# Patient Record
Sex: MALE | Race: WHITE | HISPANIC OR LATINO | Employment: FULL TIME | URBAN - METROPOLITAN AREA
[De-identification: names, ages, dates, MRNs, and addresses within clinical notes are randomized per-mention and may not be internally consistent; named-entity substitution may affect disease eponyms.]

---

## 2017-08-02 ENCOUNTER — OFFICE VISIT (OUTPATIENT)
Dept: URGENT CARE | Facility: PHYSICIAN GROUP | Age: 37
End: 2017-08-02

## 2017-08-02 VITALS
DIASTOLIC BLOOD PRESSURE: 58 MMHG | SYSTOLIC BLOOD PRESSURE: 98 MMHG | WEIGHT: 150 LBS | HEIGHT: 69 IN | HEART RATE: 74 BPM | TEMPERATURE: 97 F | BODY MASS INDEX: 22.22 KG/M2 | OXYGEN SATURATION: 98 %

## 2017-08-02 DIAGNOSIS — S91.139A PUNCTURE WOUND OF TOE OF LEFT FOOT, INITIAL ENCOUNTER: ICD-10-CM

## 2017-08-02 PROCEDURE — 99203 OFFICE O/P NEW LOW 30 MIN: CPT | Performed by: FAMILY MEDICINE

## 2017-08-02 RX ORDER — HYDROCODONE BITARTRATE AND ACETAMINOPHEN 5; 325 MG/1; MG/1
TABLET ORAL
Qty: 20 TAB | Refills: 0 | Status: SHIPPED | OUTPATIENT
Start: 2017-08-02 | End: 2017-12-06

## 2017-08-02 RX ORDER — CIPROFLOXACIN 500 MG/1
TABLET, FILM COATED ORAL
Qty: 10 TAB | Refills: 0 | Status: SHIPPED | OUTPATIENT
Start: 2017-08-02 | End: 2017-12-06

## 2017-08-02 ASSESSMENT — PATIENT HEALTH QUESTIONNAIRE - PHQ9: CLINICAL INTERPRETATION OF PHQ2 SCORE: 0

## 2017-08-02 NOTE — Clinical Note
August 2, 2017         Patient: Gadiel Edgar   YOB: 1980   Date of Visit: 8/2/2017           To Whom it May Concern:    Gadiel Edgar was seen in my clinic on 8/2/2017.     Please excuse from work for 8/3 and 8/4/17 due to medical condition.    If you have any questions or concerns, please don't hesitate to call.        Sincerely,           Bereket Casillas M.D.  Electronically Signed

## 2017-12-06 ENCOUNTER — HOSPITAL ENCOUNTER (EMERGENCY)
Facility: MEDICAL CENTER | Age: 37
End: 2017-12-06
Attending: EMERGENCY MEDICINE
Payer: MEDICAID

## 2017-12-06 ENCOUNTER — APPOINTMENT (OUTPATIENT)
Dept: RADIOLOGY | Facility: MEDICAL CENTER | Age: 37
End: 2017-12-06
Attending: EMERGENCY MEDICINE
Payer: MEDICAID

## 2017-12-06 VITALS
BODY MASS INDEX: 23.74 KG/M2 | DIASTOLIC BLOOD PRESSURE: 83 MMHG | SYSTOLIC BLOOD PRESSURE: 133 MMHG | TEMPERATURE: 98.4 F | RESPIRATION RATE: 25 BRPM | HEIGHT: 69 IN | WEIGHT: 160.27 LBS | HEART RATE: 55 BPM | OXYGEN SATURATION: 100 %

## 2017-12-06 DIAGNOSIS — R07.89 ATYPICAL CHEST PAIN: ICD-10-CM

## 2017-12-06 LAB
ALBUMIN SERPL BCP-MCNC: 4.4 G/DL (ref 3.2–4.9)
ALBUMIN/GLOB SERPL: 1.7 G/DL
ALP SERPL-CCNC: 50 U/L (ref 30–99)
ALT SERPL-CCNC: 24 U/L (ref 2–50)
ANION GAP SERPL CALC-SCNC: 6 MMOL/L (ref 0–11.9)
APTT PPP: 26.9 SEC (ref 24.7–36)
AST SERPL-CCNC: 24 U/L (ref 12–45)
BASOPHILS # BLD AUTO: 0.4 % (ref 0–1.8)
BASOPHILS # BLD: 0.03 K/UL (ref 0–0.12)
BILIRUB SERPL-MCNC: 0.6 MG/DL (ref 0.1–1.5)
BNP SERPL-MCNC: 21 PG/ML (ref 0–100)
BUN SERPL-MCNC: 14 MG/DL (ref 8–22)
CALCIUM SERPL-MCNC: 9 MG/DL (ref 8.4–10.2)
CHLORIDE SERPL-SCNC: 104 MMOL/L (ref 96–112)
CO2 SERPL-SCNC: 26 MMOL/L (ref 20–33)
CREAT SERPL-MCNC: 0.95 MG/DL (ref 0.5–1.4)
EKG IMPRESSION: NORMAL
EOSINOPHIL # BLD AUTO: 0.27 K/UL (ref 0–0.51)
EOSINOPHIL NFR BLD: 3.2 % (ref 0–6.9)
ERYTHROCYTE [DISTWIDTH] IN BLOOD BY AUTOMATED COUNT: 40.7 FL (ref 35.9–50)
GFR SERPL CREATININE-BSD FRML MDRD: >60 ML/MIN/1.73 M 2
GLOBULIN SER CALC-MCNC: 2.6 G/DL (ref 1.9–3.5)
GLUCOSE SERPL-MCNC: 83 MG/DL (ref 65–99)
HCT VFR BLD AUTO: 44.8 % (ref 42–52)
HGB BLD-MCNC: 15.4 G/DL (ref 14–18)
IMM GRANULOCYTES # BLD AUTO: 0.02 K/UL (ref 0–0.11)
IMM GRANULOCYTES NFR BLD AUTO: 0.2 % (ref 0–0.9)
INR PPP: 1 (ref 0.87–1.13)
LIPASE SERPL-CCNC: 32 U/L (ref 7–58)
LYMPHOCYTES # BLD AUTO: 2.84 K/UL (ref 1–4.8)
LYMPHOCYTES NFR BLD: 33.8 % (ref 22–41)
MCH RBC QN AUTO: 29.7 PG (ref 27–33)
MCHC RBC AUTO-ENTMCNC: 34.4 G/DL (ref 33.7–35.3)
MCV RBC AUTO: 86.3 FL (ref 81.4–97.8)
MONOCYTES # BLD AUTO: 0.46 K/UL (ref 0–0.85)
MONOCYTES NFR BLD AUTO: 5.5 % (ref 0–13.4)
NEUTROPHILS # BLD AUTO: 4.77 K/UL (ref 1.82–7.42)
NEUTROPHILS NFR BLD: 56.9 % (ref 44–72)
NRBC # BLD AUTO: 0 K/UL
NRBC BLD AUTO-RTO: 0 /100 WBC
PLATELET # BLD AUTO: 317 K/UL (ref 164–446)
PMV BLD AUTO: 8.8 FL (ref 9–12.9)
POTASSIUM SERPL-SCNC: 4 MMOL/L (ref 3.6–5.5)
PROT SERPL-MCNC: 7 G/DL (ref 6–8.2)
PROTHROMBIN TIME: 12.8 SEC (ref 12–14.6)
RBC # BLD AUTO: 5.19 M/UL (ref 4.7–6.1)
SODIUM SERPL-SCNC: 136 MMOL/L (ref 135–145)
TROPONIN I SERPL-MCNC: <0.02 NG/ML (ref 0–0.04)
WBC # BLD AUTO: 8.4 K/UL (ref 4.8–10.8)

## 2017-12-06 PROCEDURE — 99284 EMERGENCY DEPT VISIT MOD MDM: CPT

## 2017-12-06 PROCEDURE — 93005 ELECTROCARDIOGRAM TRACING: CPT | Performed by: EMERGENCY MEDICINE

## 2017-12-06 PROCEDURE — 83690 ASSAY OF LIPASE: CPT

## 2017-12-06 PROCEDURE — 36415 COLL VENOUS BLD VENIPUNCTURE: CPT

## 2017-12-06 PROCEDURE — 84484 ASSAY OF TROPONIN QUANT: CPT

## 2017-12-06 PROCEDURE — 71010 DX-CHEST-LIMITED (1 VIEW): CPT

## 2017-12-06 PROCEDURE — 83880 ASSAY OF NATRIURETIC PEPTIDE: CPT

## 2017-12-06 PROCEDURE — 85730 THROMBOPLASTIN TIME PARTIAL: CPT

## 2017-12-06 PROCEDURE — 85025 COMPLETE CBC W/AUTO DIFF WBC: CPT

## 2017-12-06 PROCEDURE — 80053 COMPREHEN METABOLIC PANEL: CPT

## 2017-12-06 PROCEDURE — 93005 ELECTROCARDIOGRAM TRACING: CPT

## 2017-12-06 PROCEDURE — 85610 PROTHROMBIN TIME: CPT

## 2017-12-06 ASSESSMENT — PAIN SCALES - GENERAL: PAINLEVEL_OUTOF10: 5

## 2017-12-07 NOTE — ED PROVIDER NOTES
"ED Provider Note    CHIEF COMPLAINT  Chief Complaint   Patient presents with   • Chest Pain     \"comes and goes\" x about one month, described as \"someone poking at me\"        HPI  Gadiel Edgar is a 37 y.o. male who presentsFor evaluation of several months of intermittent episodes of atypical anterior chest wall discomfort. It is described as poking. No dull heaviness. Not exertional. She reports no leg swelling or hemoptysis. He smokes cigarettes but has no other medical problems. Currently has no nausea vomiting or diarrhea. It appears to be atypical comes and goes without any clear elicitation. No other symptoms  REVIEW OF SYSTEMS  See HPI for further details. No night sweats leg swelling hemoptysis fevers chills cough All other systems are negative.     PAST MEDICAL HISTORY  No past medical history on file.  No significant medical history  FAMILY HISTORY  Father had early coronary artery disease    SOCIAL HISTORY  Social History     Social History   • Marital status: Single     Spouse name: N/A   • Number of children: N/A   • Years of education: N/A     Social History Main Topics   • Smoking status: Current Every Day Smoker     Packs/day: 1.00   • Smokeless tobacco: Never Used   • Alcohol use 0.0 oz/week   • Drug use:       Comment: Marijuana   • Sexual activity: Not on file     Other Topics Concern   • Not on file     Social History Narrative   • No narrative on file   Cigarettes denies cocaine or amphetamines     SURGICAL HISTORY  No past surgical history on file.  No major surgeries  CURRENT MEDICATIONS  Home Medications     Reviewed by Clint Mcconnell R.N. (Registered Nurse) on 12/06/17 at 1758  Med List Status: Complete   Medication Last Dose Status        Patient Christian Taking any Medications                       ALLERGIES  No Known Allergies    PHYSICAL EXAM  VITAL SIGNS: /88   Pulse 72   Temp 36.9 °C (98.4 °F)   Resp 16   Ht 1.753 m (5' 9\")   Wt 72.7 kg (160 lb 4.4 oz)   SpO2 99%   BMI 23.67 " kg/m²  Room air O2: 99    Constitutional: Well developed, Well nourished, No acute distress, Non-toxic appearance.   HENT: Normocephalic, Atraumatic, Bilateral external ears normal, Oropharynx moist, No oral exudates, Nose normal.   Eyes: PERRLA, EOMI, Conjunctiva normal, No discharge.   Neck: Normal range of motion, No tenderness, Supple, No stridor.   Lymphatic: No lymphadenopathy noted.   Cardiovascular: Normal heart rate, Normal rhythm, No murmurs, No rubs, No gallops.   Thorax & Lungs: Normal breath sounds, No respiratory distress, No wheezing, No chest tenderness.   Abdomen: Bowel sounds normal, Soft, No tenderness, No masses, No pulsatile masses.   Skin: Warm, Dry, No erythema, No rash.   Extremities: Intact distal pulses, No edema, No tenderness, No cyanosis, No clubbing.   Musculoskeletal: Good range of motion in all major joints. No tenderness to palpation or major deformities noted.   Neurologic: Alert & oriented x 3, Normal motor function, Normal sensory function, No focal deficits noted.   Psychiatric: Anxious    EKG  EKG Interpretation    Interpreted by me    Rhythm: normal sinus   Rate: normal  Axis: normal  Ectopy: none  Conduction: normal  ST Segments: There appears to be repolarization pattern in the anterior leads  T Waves: no acute change  Q Waves: none    Clinical Impression: no acute changes and normal EKG    DX-CHEST-LIMITED (1 VIEW)   Final Result      1.  No acute cardiac or pulmonary abnormalities are identified.        Results for orders placed or performed during the hospital encounter of 12/06/17   Troponin   Result Value Ref Range    Troponin I <0.02 0.00 - 0.04 ng/mL   CBC with Differential   Result Value Ref Range    WBC 8.4 4.8 - 10.8 K/uL    RBC 5.19 4.70 - 6.10 M/uL    Hemoglobin 15.4 14.0 - 18.0 g/dL    Hematocrit 44.8 42.0 - 52.0 %    MCV 86.3 81.4 - 97.8 fL    MCH 29.7 27.0 - 33.0 pg    MCHC 34.4 33.7 - 35.3 g/dL    RDW 40.7 35.9 - 50.0 fL    Platelet Count 317 164 - 446 K/uL     MPV 8.8 (L) 9.0 - 12.9 fL    Neutrophils-Polys 56.90 44.00 - 72.00 %    Lymphocytes 33.80 22.00 - 41.00 %    Monocytes 5.50 0.00 - 13.40 %    Eosinophils 3.20 0.00 - 6.90 %    Basophils 0.40 0.00 - 1.80 %    Immature Granulocytes 0.20 0.00 - 0.90 %    Nucleated RBC 0.00 /100 WBC    Neutrophils (Absolute) 4.77 1.82 - 7.42 K/uL    Lymphs (Absolute) 2.84 1.00 - 4.80 K/uL    Monos (Absolute) 0.46 0.00 - 0.85 K/uL    Eos (Absolute) 0.27 0.00 - 0.51 K/uL    Baso (Absolute) 0.03 0.00 - 0.12 K/uL    Immature Granulocytes (abs) 0.02 0.00 - 0.11 K/uL    NRBC (Absolute) 0.00 K/uL   Complete Metabolic Panel (CMP)   Result Value Ref Range    Sodium 136 135 - 145 mmol/L    Potassium 4.0 3.6 - 5.5 mmol/L    Chloride 104 96 - 112 mmol/L    Co2 26 20 - 33 mmol/L    Anion Gap 6.0 0.0 - 11.9    Glucose 83 65 - 99 mg/dL    Bun 14 8 - 22 mg/dL    Creatinine 0.95 0.50 - 1.40 mg/dL    Calcium 9.0 8.4 - 10.2 mg/dL    AST(SGOT) 24 12 - 45 U/L    ALT(SGPT) 24 2 - 50 U/L    Alkaline Phosphatase 50 30 - 99 U/L    Total Bilirubin 0.6 0.1 - 1.5 mg/dL    Albumin 4.4 3.2 - 4.9 g/dL    Total Protein 7.0 6.0 - 8.2 g/dL    Globulin 2.6 1.9 - 3.5 g/dL    A-G Ratio 1.7 g/dL   Prothrombin Time   Result Value Ref Range    PT 12.8 12.0 - 14.6 sec    INR 1.00 0.87 - 1.13   APTT   Result Value Ref Range    APTT 26.9 24.7 - 36.0 sec   Lipase   Result Value Ref Range    Lipase 32 7 - 58 U/L   ESTIMATED GFR   Result Value Ref Range    GFR If African American >60 >60 mL/min/1.73 m 2    GFR If Non African American >60 >60 mL/min/1.73 m 2   EKG (NOW)   Result Value Ref Range    Report       [facility]    Test Date:  2017  Pt Name:    ISABELLA KIMBLE               Department: [facility]  MRN:        [id number]                      Room:  Gender:     M                            Technician: ELROY  :        1980                   Requested By:ER TRIAGE PROTOCOL  Order #:    [id number]                    Reading  MD:    Measurements  Intervals                                Axis  Rate:       65                           P:          23  WV:         181                          QRS:        47  QRSD:       112                          T:          34  QT:         354  QTc:        368    Interpretive Statements  Sinus rhythm  Borderline intraventricular conduction delay  ST elev, probable normal early repol pattern  No previous ECG available for comparison              COURSE & MEDICAL DECISION MAKING  Pertinent Labs & Imaging studies reviewed. (See chart for details)  Patient presented here with over a month of atypical symptoms. His TRAVON score is 0 and his heart score is 0. Only risk factor is really smoking and family history. His symptoms are very atypical. His PERC score is 0 therefore d-dimer testing is not indicated. He has a repolarization pattern on his EKG however it does not appear to be ischemic and his troponin is negative. I did not feel that admission for further were stratification is indicated. I will recommend the patient establish care with PCP and return immediately if he develops any new or worsening symptoms    FINAL IMPRESSION  1. Atypical chest pain         Electronically signed by: Padilla Alcala, 12/6/2017 5:57 PM

## 2017-12-07 NOTE — ED NOTES
"Chief Complaint   Patient presents with   • Chest Pain     \"comes and goes\" x about one month, described as \"someone poking at me\"      /88   Pulse 72   Temp 36.9 °C (98.4 °F)   Resp 16   Ht 1.753 m (5' 9\")   Wt 72.7 kg (160 lb 4.4 oz)   SpO2 99%   BMI 23.67 kg/m²     "

## 2017-12-07 NOTE — ED NOTES
Pt ambulates to room w/ steady gait. A & O, privacy, gowned, monitored, plan of care & support given.    Pt states over month or possibly longer sharp chest pain noted. Pt states frequency and intensity increasing over past few days. 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   Pt denies use of cocaine/meth but adds he does use mariajuana recreationally.    Pt adds that he also has LLQ pain for which he was assessed for possible torn muscles.

## 2017-12-07 NOTE — DISCHARGE INSTRUCTIONS
Chest Pain, Nonspecific  It is often hard to give a specific diagnosis for the cause of chest pain. There is always a chance that your pain could be related to something serious, like a heart attack or a blood clot in the lungs. You need to follow up with your caregiver for further evaluation. More lab tests or other studies such as X-rays, electrocardiography, stress testing, or cardiac imaging may be needed to find the cause of your pain.  Most of the time, nonspecific chest pain improves within 2 to 3 days with rest and mild pain medicine. For the next few days, avoid physical exertion or activities that bring on pain. Do not smoke. Avoid drinking alcohol. Call your caregiver for routine follow-up as advised.   SEEK IMMEDIATE MEDICAL CARE IF:  · You develop increased chest pain or pain that radiates to the arm, neck, jaw, back, or abdomen.   · You develop shortness of breath, increased coughing, or you start coughing up blood.   · You have severe back or abdominal pain, nausea, or vomiting.   · You develop severe weakness, fainting, fever, or chills.   Document Released: 12/18/2006 Document Revised: 03/11/2013 Document Reviewed: 06/06/2008  [employer]® Patient Information ©2013 [employer].

## 2018-02-10 ENCOUNTER — HOSPITAL ENCOUNTER (EMERGENCY)
Facility: MEDICAL CENTER | Age: 38
End: 2018-02-10
Attending: EMERGENCY MEDICINE
Payer: COMMERCIAL

## 2018-02-10 ENCOUNTER — APPOINTMENT (OUTPATIENT)
Dept: RADIOLOGY | Facility: MEDICAL CENTER | Age: 38
End: 2018-02-10
Attending: EMERGENCY MEDICINE
Payer: COMMERCIAL

## 2018-02-10 VITALS
OXYGEN SATURATION: 99 % | SYSTOLIC BLOOD PRESSURE: 135 MMHG | BODY MASS INDEX: 23.39 KG/M2 | DIASTOLIC BLOOD PRESSURE: 86 MMHG | TEMPERATURE: 98.1 F | HEIGHT: 68 IN | WEIGHT: 154.32 LBS | RESPIRATION RATE: 16 BRPM | HEART RATE: 82 BPM
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
## 2018-02-10 PROCEDURE — 99284 EMERGENCY DEPT VISIT MOD MDM: CPT

## 2018-02-10 PROCEDURE — 71260 CT THORAX DX C+: CPT

## 2018-02-10 PROCEDURE — 72125 CT NECK SPINE W/O DYE: CPT

## 2018-02-10 PROCEDURE — 72128 CT CHEST SPINE W/O DYE: CPT

## 2018-02-10 PROCEDURE — 72131 CT LUMBAR SPINE W/O DYE: CPT

## 2018-02-10 PROCEDURE — 70450 CT HEAD/BRAIN W/O DYE: CPT

## 2018-02-10 PROCEDURE — 700117 HCHG RX CONTRAST REV CODE 255: Performed by: EMERGENCY MEDICINE

## 2018-02-10 PROCEDURE — 305948 HCHG GREEN TRAUMA ACT PRE-NOTIFY NO CC

## 2018-02-10 RX ADMIN — IOHEXOL 100 ML: 350 INJECTION, SOLUTION INTRAVENOUS at 15:50

## 2018-02-10 ASSESSMENT — ENCOUNTER SYMPTOMS
NECK PAIN: 1
BACK PAIN: 1
LOSS OF CONSCIOUSNESS: 0
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
## 2018-02-11 NOTE — ED NOTES
Agree with triage note. Pt awaiting results from radiology studies. Pt has no needs at this time. Call light within reach.

## 2019-05-16 NOTE — PROGRESS NOTES
Chief Complaint:    Chief Complaint   Patient presents with   • Injury     stepped on a nail x 11:00 am on left 2nd toe, pt states feels numb and hit his bone       History of Present Illness:    This is a new problem. Today he stepped on a nail that went through his boot into his left 2nd toe and out the dorsal side of the left 2nd toe. He pulled the nail out. He took a shower afterwards. Area is painful and numb feeling. Had Tdap 12/22/13 per Epic.      Review of Systems:    Constitutional: Negative for fever, chills, and diaphoresis.   Eyes: Negative for change in vision, photophobia, pain, redness, and discharge.  ENT: Negative for ear pain, ear discharge, hearing loss, tinnitus, nasal congestion, nosebleeds, and sore throat.    Respiratory: Negative for cough, hemoptysis, sputum production, shortness of breath, wheezing, and stridor.    Cardiovascular: Negative for chest pain, palpitations, orthopnea, claudication, leg swelling, and PND.   Gastrointestinal: Negative for abdominal pain, nausea, vomiting, diarrhea, constipation, blood in stool, and melena.   Musculoskeletal: See HPI.  Skin: See HPI.  Neurological: See HPI.      Past Medical History:    History reviewed. No pertinent past medical history.    Past Surgical History:    History reviewed. No pertinent past surgical history.    Social History:    Social History     Social History   • Marital Status: Single     Spouse Name: N/A   • Number of Children: N/A   • Years of Education: N/A     Social History Main Topics   • Smoking status: Current Every Day Smoker -- 1.00 packs/day   • Smokeless tobacco: Never Used   • Alcohol Use: 0.0 oz/week     0 Standard drinks or equivalent per week      Comment: occations   • Drug Use: No   • Sexual Activity: Not Asked     Other Topics Concern   • None     Social History Narrative       Family History:    History reviewed. No pertinent family history.    Medications:    No current outpatient prescriptions on file prior  "to visit.     No current facility-administered medications on file prior to visit.       Allergies:    No Known Allergies      Vitals:    Filed Vitals:    08/02/17 1719   BP: 98/58   Pulse: 74   Temp: 36.1 °C (97 °F)   Height: 1.753 m (5' 9.02\")   Weight: 68.04 kg (150 lb)   SpO2: 98%       Physical Exam:    Constitutional: Vital signs reviewed. Appears well-developed and well-nourished. No acute distress.   Cardiovascular: Peripheral pulses 2+.   Pulmonary/Chest: Respirations non-labored.  Musculoskeletal: No muscular atrophy or weakness.  Neurological: Alert and oriented to person, place, and time. Muscle tone normal. Coordination normal. Light touch and sensation grossly normal.   Skin: Plantar aspect of left 2nd toe has wound where nail entered (minimal controlled bleeding, wound is not gaping open, soft tissue swelling around this area) and it exited on dorsal aspect of toe, just adjacent to nail laterally (this area is scabbed).  Psychiatric: Normal mood and affect. Behavior is normal. Judgment and thought content normal.       Assessment / Plan:    1. Puncture wound of toe of left foot, initial encounter  - ciprofloxacin (CIPRO) 500 MG Tab; 1 TAB TWICE A DAY X 5 DAYS.  Dispense: 10 Tab; Refill: 0  - hydrocodone-acetaminophen (NORCO) 5-325 MG Tab per tablet; 1 TAB EVERY 6 HOURS ONLY IF NEEDED FOR PAIN. MAY CAUSE DROWSINESS.  Dispense: 20 Tab; Refill: 0      Work note given - excuse for 8/3 and 8/4/17.    Discussed with him DDX and management options.    Rec'd relative rest.    He is up-to-date on tetanus immunization status.    Since he is self-pay and left 2nd toe has no gross deformity, I do not think we need to do x-rays today. He is agreeable.    Since nail punctured through sole of boot, completely through toe, I will have him take prophylactic antibiotic.    Agreeable to medications prescribed.  report checked - no Rx x 3 years.    He will also apply antibiotic ointment (provided) to wounds until " better.    Follow-up with PCP or urgent care if getting worse or not better with above.     REVIEW OF SYSTEMS    General:	Alert, denies confusion, distress  Skin: no breakdown,   Ophthalmologic:  ENMT:	  Respiratory:  Cardiovascular:	  Gastrointestinal:	  Genitourinary:	  Musculoskeletal:	  Neurological:	  Psychiatric: REVIEW OF SYSTEMS    General:	Alert, denies confusion, distress  Skin: no breakdown, no rashes, itching  Ophthalmologic: no visual changes, blurriness, pain, discharge  Respiratory: c/o dyspnea, denies cough, wheezing  Cardiovascular: no chest pain, palpitations  Gastrointestinal: c/o diarrhea x2days, decreased appetite, denies vomitting  Genitourinary: denies dysuria, discharge  Musculoskeletal: denies pain, stiffness, swelling  Neurological: denies numbness, tingling  Psychiatric: confused - normal state of mentation

## 2019-10-07 ENCOUNTER — TELEPHONE (OUTPATIENT)
Dept: SCHEDULING | Facility: IMAGING CENTER | Age: 39
End: 2019-10-07

## 2019-11-13 ENCOUNTER — OFFICE VISIT (OUTPATIENT)
Dept: MEDICAL GROUP | Facility: LAB | Age: 39
End: 2019-11-13
Payer: COMMERCIAL

## 2019-11-13 VITALS
HEIGHT: 67 IN | DIASTOLIC BLOOD PRESSURE: 78 MMHG | WEIGHT: 168 LBS | RESPIRATION RATE: 15 BRPM | OXYGEN SATURATION: 95 % | SYSTOLIC BLOOD PRESSURE: 122 MMHG | BODY MASS INDEX: 26.37 KG/M2 | TEMPERATURE: 97.8 F | HEART RATE: 75 BPM

## 2019-11-13 DIAGNOSIS — L72.0 EPIDERMAL INCLUSION CYST: ICD-10-CM

## 2019-11-13 DIAGNOSIS — Z72.0 TOBACCO ABUSE: ICD-10-CM

## 2019-11-13 DIAGNOSIS — Z23 NEED FOR VACCINATION: ICD-10-CM

## 2019-11-13 DIAGNOSIS — Z00.00 HEALTH CARE MAINTENANCE: ICD-10-CM

## 2019-11-13 PROCEDURE — 90471 IMMUNIZATION ADMIN: CPT | Performed by: FAMILY MEDICINE

## 2019-11-13 PROCEDURE — 90732 PPSV23 VACC 2 YRS+ SUBQ/IM: CPT | Performed by: FAMILY MEDICINE

## 2019-11-13 PROCEDURE — 90472 IMMUNIZATION ADMIN EACH ADD: CPT | Performed by: FAMILY MEDICINE

## 2019-11-13 PROCEDURE — 99396 PREV VISIT EST AGE 40-64: CPT | Mod: 25 | Performed by: FAMILY MEDICINE

## 2019-11-13 PROCEDURE — 90686 IIV4 VACC NO PRSV 0.5 ML IM: CPT | Performed by: FAMILY MEDICINE

## 2019-11-13 RX ORDER — VARENICLINE TARTRATE 1 MG/1
1 TABLET, FILM COATED ORAL 2 TIMES DAILY
Qty: 60 TAB | Refills: 3 | Status: SHIPPED
Start: 2019-11-13 | End: 2020-02-21

## 2019-11-13 SDOH — HEALTH STABILITY: MENTAL HEALTH: HOW OFTEN DO YOU HAVE 6 OR MORE DRINKS ON ONE OCCASION?: NEVER

## 2019-11-13 SDOH — HEALTH STABILITY: MENTAL HEALTH: HOW OFTEN DO YOU HAVE A DRINK CONTAINING ALCOHOL?: MONTHLY OR LESS

## 2019-11-13 SDOH — HEALTH STABILITY: MENTAL HEALTH: HOW MANY STANDARD DRINKS CONTAINING ALCOHOL DO YOU HAVE ON A TYPICAL DAY?: 1 OR 2

## 2019-11-13 ASSESSMENT — ENCOUNTER SYMPTOMS
WHEEZING: 0
ABDOMINAL PAIN: 0
CHILLS: 0
WEIGHT LOSS: 0
DIARRHEA: 0
NAUSEA: 0
VOMITING: 0
COUGH: 0
CONSTIPATION: 0
FEVER: 0
SHORTNESS OF BREATH: 0
BLURRED VISION: 0
PALPITATIONS: 0

## 2019-11-13 ASSESSMENT — PATIENT HEALTH QUESTIONNAIRE - PHQ9: CLINICAL INTERPRETATION OF PHQ2 SCORE: 0

## 2019-11-13 NOTE — PROGRESS NOTES
Gadiel Edgar is a 39 y.o. male here for   Chief Complaint   Patient presents with   • Establish Care     haven't had a pcp in a while, be sure everything is running properly, blood work request   • Bump     X 2 1/2 years. On left leg under crease of butt        HPI:  Gadiel is a very pleasant 39 y.o. male.     #Lump:  -Onset: 1-2 years.   -Non painfull   -has grown slightly since first noticing.   -soft mass  -mobile   -denies any trauma, redness, swelling, tenderness, heat to touch.   -Hx of perirectal abscess 1 year ago.     #Tobacco abuse:  -History of tobacco use, 1/4 pack to 1/2 pack daily since 2006.   -Has interest in quitting. Previous attempts include cold turkey.   -He states that he is seriously interested at this point as his 2 daughters have been pushing him to quit smoking.  -Patient is concerned because he feels like smoking cessation at this point would be difficult as smoking is become a habit.  Discussed with him when he has the urge to smoke cigarettes and why habits metformin can be broken.    #Health Maintenance  Advanced directive: n/a   PT/vit D for falls prevention: n/a   Cholesterol Screening: Order today   Diabetes Screening: n/a   AAA Screening: n/a   Aspirin Use: n/a    Diet: n/a   Exercise: weight lifting a few times a week    Substance Abuse: n/a   Seat belts, bike helmet, gun safety discussed.  Sun protection used.    Cancer screening  Colorectal Cancer Screening: n/a    Lung Cancer Screening: n/a    Prostate Cancer Screening/PSA: n/a     Infectious disease screening/Immunizations  --STI Screening: n/a  --Practices safe sex.  --Immunizations: flu vaccines, pneumo needed           Current medicines (including changes today)  Current Outpatient Medications   Medication Sig Dispense Refill   • Multiple Vitamin (MULTI-VITAMIN DAILY PO) Take  by mouth.       No current facility-administered medications for this visit.      He  has no past medical history on file.  He  has no past  surgical history on file.  Social History     Tobacco Use   • Smoking status: Current Every Day Smoker     Packs/day: 0.25   • Smokeless tobacco: Never Used   Substance Use Topics   • Alcohol use: Yes     Alcohol/week: 0.0 oz     Frequency: Monthly or less     Drinks per session: 1 or 2     Binge frequency: Never     Comment: Rare   • Drug use: Not Currently     Social History     Patient does not qualify to have social determinant information on file (likely too young).   Social History Narrative   • Not on file     No family history on file.  No family status information on file.         ROS  Review of Systems   Constitutional: Negative for chills, fever, malaise/fatigue and weight loss.   HENT: Negative for hearing loss.    Eyes: Negative for blurred vision.   Respiratory: Negative for cough, shortness of breath and wheezing.    Cardiovascular: Negative for chest pain and palpitations.   Gastrointestinal: Negative for abdominal pain, constipation, diarrhea, nausea and vomiting.   Skin: Negative for rash.   All other systems reviewed and are negative.       Objective:     /78 (BP Location: Right arm, Patient Position: Sitting, BP Cuff Size: Adult)   Pulse 75   Temp 36.6 °C (97.8 °F) (Temporal)   Resp 15   Wt 76.2 kg (168 lb)   SpO2 95%  Body mass index is 25.54 kg/m².  Physical Exam:    Constitutional: Alert, no distress.  Skin: Warm, dry, good turgor.  Small, 2 mm round, soft, mobile mass located on the dorsal aspect of the left lower extremity at the gluteal fold.  It non-tender.  There is appear to be a punctate opening at the center of mass.  No erythema, edema, tenderness to palpation, heat to touch.  Eye: Equal, round and reactive, conjunctiva clear, lids normal.  ENMT: Lips without lesions, good dentition, oropharynx clear. TM's pearly gray with normal light reflexes bilaterally  Neck: Trachea midline, no masses, no thyromegaly. No cervical or supraclavicular lymphadenopathy.  Respiratory:  Unlabored respiratory effort, lungs clear to auscultation bilaterally, no wheezes, rales, or ronchi.  Cardiovascular: Normal S1, S2, RRR, no murmur, no edema.  Abdomen: Soft, non-tender, no masses, no hepatosplenomegaly.  Psych: Alert and oriented x3, normal affect and mood.        Assessment and Plan:   The following treatment plan was discussed    1. Health care maintenance  -Age-appropriate counseled regarding diet, exercise, infectious disease/cancer screenings was given.  Will check lipid profile at this time.  Also discussed tobacco abuse and tobacco cessation (see below).  - Lipid Profile; Future    2. Epidermal inclusion cyst  -Signs and symptoms suggestive of an epidermal inclusion cyst on the back of his leg.  -Discussed conservative treatment measures including warm compresses or definitive treatment with removal of cyst if needed in the future.    3. Tobacco abuse  TOBACCO: Spent 20 minutes face-to-face with patient discussing nicotine addiction and smoking cessation as outlined in HPI as well as below:     Chantix  Patient desires to quit smoking. We discussed various options for quitting smoking including nicotine replacement, bupropion, Chantix, and quitting cold turkey.  -Patient elected Chantix.  -Discussed the side effects including nausea, vomiting, gas, abdominal pain, dry mouth, abnormal taste(usually reversible) inability to sleep, headache, bad dreams, and rare psychosis.  -Patient will start Chantix 0.5 mg by mouth daily for 3 days, then 0.5 mg twice daily for 4-7 days, then 1 mg twice a day.  -Patient was instructed that if they stop smoking within 3 months, then they should continue the Chantix for an additional 3 months time. If patient does not stop smoking within 3 months, then they should discontinue Chantix at that time because remaining on the medication is not likely to benefit the patient.  -Reviewed past quit attempts--what helped, what led to relapse.  -Anticipated challenges,  particularly during the first few weeks, including with nicotine withdrawal.  -Discussed reasons for quitting and benefits of quitting for the patient including for his own health, hoping to begin exercising and running again.  Also states is concerned about his daughter's example he is setting for them.  -They will elicit support of friends and family.  -Discussed the psychological addiction associated with smoking and that they will need to replace typical smoking times with other activities such as walking, carrots sticks, small frequent meals, sucking hard candy, sugar-free candy, or chewing sugar-free gum or other worthwhile activities.  -They were given information regarding tobacco quit line [phone], for [place] speakers [phone]  -Patient given information regarding free texting service- http://smokefree.gov/smokefreetxt  -They will follow-up with me in 2-3 months, sooner if needed.      4. Need for vaccination  - Pneumococal Polysaccharide Vaccine 23-Valent =>1YO SQ/IM  - Influenza Vaccine Quad Injection (PF)        Records requested.  Followup: Return in about 3 months (around 2/13/2020).         This note was created using voice recognition software. I have made every reasonable attempt to correct errors, however, I do anticipate some grammatical errors.

## 2019-11-22 ENCOUNTER — HOSPITAL ENCOUNTER (OUTPATIENT)
Dept: LAB | Facility: MEDICAL CENTER | Age: 39
End: 2019-11-22
Attending: FAMILY MEDICINE
Payer: COMMERCIAL

## 2019-11-22 DIAGNOSIS — Z00.00 HEALTH CARE MAINTENANCE: ICD-10-CM

## 2019-11-22 LAB
CHOLEST SERPL-MCNC: 216 MG/DL (ref 100–199)
FASTING STATUS PATIENT QL REPORTED: NORMAL
HDLC SERPL-MCNC: 49 MG/DL
LDLC SERPL CALC-MCNC: 148 MG/DL
TRIGL SERPL-MCNC: 95 MG/DL (ref 0–149)

## 2019-11-22 PROCEDURE — 36415 COLL VENOUS BLD VENIPUNCTURE: CPT

## 2019-11-22 PROCEDURE — 80061 LIPID PANEL: CPT

## 2020-01-01 ENCOUNTER — OFFICE VISIT (OUTPATIENT)
Dept: URGENT CARE | Facility: CLINIC | Age: 40
End: 2020-01-01
Payer: COMMERCIAL

## 2020-01-01 VITALS
OXYGEN SATURATION: 97 % | RESPIRATION RATE: 18 BRPM | SYSTOLIC BLOOD PRESSURE: 128 MMHG | HEART RATE: 85 BPM | DIASTOLIC BLOOD PRESSURE: 70 MMHG | BODY MASS INDEX: 26.66 KG/M2 | HEIGHT: 69 IN | WEIGHT: 180 LBS | TEMPERATURE: 98.7 F

## 2020-01-01 DIAGNOSIS — F17.200 SMOKER: ICD-10-CM

## 2020-01-01 DIAGNOSIS — J98.8 RTI (RESPIRATORY TRACT INFECTION): ICD-10-CM

## 2020-01-01 DIAGNOSIS — R09.81 HEAD CONGESTION: ICD-10-CM

## 2020-01-01 PROCEDURE — 99214 OFFICE O/P EST MOD 30 MIN: CPT | Performed by: NURSE PRACTITIONER

## 2020-01-01 RX ORDER — ALBUTEROL SULFATE 90 UG/1
2 AEROSOL, METERED RESPIRATORY (INHALATION) EVERY 6 HOURS PRN
Qty: 8.5 G | Refills: 0 | Status: SHIPPED | OUTPATIENT
Start: 2020-01-01 | End: 2020-04-04

## 2020-01-01 RX ORDER — METHYLPREDNISOLONE 4 MG/1
TABLET ORAL
Qty: 21 TAB | Refills: 0 | Status: SHIPPED
Start: 2020-01-01 | End: 2020-02-21

## 2020-01-01 RX ORDER — AZITHROMYCIN 250 MG/1
TABLET, FILM COATED ORAL
Qty: 6 TAB | Refills: 0 | Status: SHIPPED
Start: 2020-01-01 | End: 2020-02-21

## 2020-01-01 ASSESSMENT — ENCOUNTER SYMPTOMS
SORE THROAT: 0
COUGH: 1
RHINORRHEA: 1
FEVER: 0

## 2020-01-01 NOTE — PROGRESS NOTES
Subjective:      Gadiel Edgar is a 39 y.o. male who presents with Cough (x3 days); Congestion (chest); Sinus Problem; and Otalgia            Cough   This is a new problem. Episode onset: pt reports new onset of sinus congestion, chest congestion, sneezing and plugged ears for 3 days. No fevers.  The problem has been unchanged. The cough is non-productive. Associated symptoms include ear congestion, nasal congestion and rhinorrhea. Pertinent negatives include no fever or sore throat. Risk factors for lung disease include smoking/tobacco exposure. He has tried rest (OTC Advil cold and flu) for the symptoms. The treatment provided mild relief. His past medical history is significant for pneumonia. There is no history of asthma.       Review of Systems   Constitutional: Negative for fever.   HENT: Positive for congestion and rhinorrhea. Negative for sore throat.    Respiratory: Positive for cough.    All other systems reviewed and are negative.      No past medical history on file. No past surgical history on file.   Social History     Socioeconomic History   • Marital status: Single     Spouse name: Not on file   • Number of children: Not on file   • Years of education: Not on file   • Highest education level: Not on file   Occupational History   • Not on file   Social Needs   • Financial resource strain: Not on file   • Food insecurity:     Worry: Not on file     Inability: Not on file   • Transportation needs:     Medical: Not on file     Non-medical: Not on file   Tobacco Use   • Smoking status: Current Every Day Smoker     Packs/day: 0.25   • Smokeless tobacco: Never Used   • Tobacco comment: since 2006    Substance and Sexual Activity   • Alcohol use: Yes     Alcohol/week: 0.0 oz     Frequency: Monthly or less     Drinks per session: 1 or 2     Binge frequency: Never     Comment: Rare   • Drug use: Not Currently   • Sexual activity: Not on file   Lifestyle   • Physical activity:     Days per week: Not on  "file     Minutes per session: Not on file   • Stress: Not on file   Relationships   • Social connections:     Talks on phone: Not on file     Gets together: Not on file     Attends Confucianist service: Not on file     Active member of club or organization: Not on file     Attends meetings of clubs or organizations: Not on file     Relationship status: Not on file   • Intimate partner violence:     Fear of current or ex partner: Not on file     Emotionally abused: Not on file     Physically abused: Not on file     Forced sexual activity: Not on file   Other Topics Concern   • Not on file   Social History Narrative   • Not on file        Objective:     /70 (BP Location: Right arm, Patient Position: Sitting, BP Cuff Size: Adult)   Pulse 85   Temp 37.1 °C (98.7 °F) (Temporal)   Resp 18   Ht 1.753 m (5' 9\")   Wt 81.6 kg (180 lb)   SpO2 97%   BMI 26.58 kg/m²      Physical Exam  Vitals signs and nursing note reviewed.   Constitutional:       Appearance: Normal appearance. He is normal weight.   HENT:      Head: Normocephalic and atraumatic.      Right Ear: Tympanic membrane and external ear normal.      Left Ear: Tympanic membrane and external ear normal.      Nose: Congestion present.      Mouth/Throat:      Mouth: Mucous membranes are moist.      Pharynx: Oropharynx is clear.   Eyes:      Extraocular Movements: Extraocular movements intact.      Pupils: Pupils are equal, round, and reactive to light.   Neck:      Musculoskeletal: Normal range of motion.   Cardiovascular:      Rate and Rhythm: Normal rate and regular rhythm.   Pulmonary:      Effort: Pulmonary effort is normal.      Breath sounds: Examination of the right-lower field reveals wheezing. Examination of the left-lower field reveals wheezing. Wheezing (mild) present. No rales.   Musculoskeletal: Normal range of motion.   Skin:     General: Skin is warm and dry.      Capillary Refill: Capillary refill takes less than 2 seconds.   Neurological:      " General: No focal deficit present.      Mental Status: He is alert and oriented to person, place, and time. Mental status is at baseline.   Psychiatric:         Mood and Affect: Mood normal.         Speech: Speech normal.         Thought Content: Thought content normal.         Judgment: Judgment normal.                 Assessment/Plan:     1. Head congestion    2. Smoker    3. RTI (respiratory tract infection)  - azithromycin (ZITHROMAX) 250 MG Tab; Take as directed  Dispense: 6 Tab; Refill: 0  - methylPREDNISolone (MEDROL DOSEPAK) 4 MG Tablet Therapy Pack; Follow schedule on package instructions.  Dispense: 21 Tab; Refill: 0  - albuterol 108 (90 Base) MCG/ACT Aero Soln inhalation aerosol; Inhale 2 Puffs by mouth every 6 hours as needed.  Dispense: 8.5 g; Refill: 0    Take full course of abx and steroids  Continue advil cold and flu as directed  Encouraged rest, fluids and supportive care  He understands and agrees with POC  Supportive care, differential diagnoses, and indications for immediate follow-up discussed with patient.    Pathogenesis of diagnosis discussed including typical length and natural progression.      Instructed to return to  or nearest emergency department if symptoms fail to improve, for any change in condition, further concerns, or new concerning symptoms.  Patient states understanding of the plan of care and discharge instructions.

## 2020-02-21 ENCOUNTER — OFFICE VISIT (OUTPATIENT)
Dept: MEDICAL GROUP | Facility: LAB | Age: 40
End: 2020-02-21
Payer: COMMERCIAL

## 2020-02-21 VITALS
OXYGEN SATURATION: 96 % | BODY MASS INDEX: 25.92 KG/M2 | DIASTOLIC BLOOD PRESSURE: 72 MMHG | HEART RATE: 88 BPM | WEIGHT: 175 LBS | SYSTOLIC BLOOD PRESSURE: 110 MMHG | TEMPERATURE: 98.2 F | HEIGHT: 69 IN | RESPIRATION RATE: 16 BRPM

## 2020-02-21 DIAGNOSIS — L72.0 EPIDERMAL INCLUSION CYST: ICD-10-CM

## 2020-02-21 DIAGNOSIS — Z72.0 TOBACCO ABUSE: ICD-10-CM

## 2020-02-21 DIAGNOSIS — G43.109 OCULAR MIGRAINE: ICD-10-CM

## 2020-02-21 DIAGNOSIS — E78.5 DYSLIPIDEMIA: ICD-10-CM

## 2020-02-21 PROCEDURE — 99213 OFFICE O/P EST LOW 20 MIN: CPT | Performed by: FAMILY MEDICINE

## 2020-02-21 ASSESSMENT — ENCOUNTER SYMPTOMS
PALPITATIONS: 0
CHILLS: 0
ABDOMINAL PAIN: 0
TINGLING: 0
DIARRHEA: 0
EYE DISCHARGE: 0
HEADACHES: 1
SENSORY CHANGE: 0
WHEEZING: 0
TREMORS: 0
NAUSEA: 0
SPEECH CHANGE: 0
SHORTNESS OF BREATH: 0
FOCAL WEAKNESS: 0
VOMITING: 0
PHOTOPHOBIA: 0
WEAKNESS: 0
DOUBLE VISION: 0
EYE PAIN: 0
FEVER: 0

## 2020-02-21 ASSESSMENT — PATIENT HEALTH QUESTIONNAIRE - PHQ9: CLINICAL INTERPRETATION OF PHQ2 SCORE: 0

## 2020-02-21 NOTE — PROGRESS NOTES
"Subjective:   Gadiel Edgar is a 39 y.o. 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  Has not taken the Chantix but continues to smoke about 6 cigarettes a day.  Patient is pre-contemplative, states that his desire to actually quit smoking in 6/10.    #Ocular migraines:  -Patient states about possibly 1 month ago started noticing pain behind the right eye that was intermittent but severe.  Also had associated symptoms of colors.  He also states that he noticed \"backwards sees\" floating around.  He had scheduled an appoint with an ophthalmologist who states that everything was unremarkable, no changes or detachments of his retina.  -Continues to have symptoms of pain behind eye that is pulsating with visual changes.  Denies any numbness, tingling of face, difficulty talking, difficulty speaking.    #Epidermal inclusion cyst:  -Patient was recently diagnosed epidermal occlusion cyst on his left leg just above gluteal fold.  Continues to be a problem causing irritation.  Wishes for removal at this time.    No Known Allergies      Current medicines (including changes today)  Current Outpatient Medications   Medication Sig Dispense Refill   • albuterol 108 (90 Base) MCG/ACT Aero Soln inhalation aerosol Inhale 2 Puffs by mouth every 6 hours as needed. 8.5 g 0   • Multiple Vitamin (MULTI-VITAMIN DAILY PO) Take  by mouth.       No current facility-administered medications for this visit.      He  has no past medical history on file.    ROS   Review of Systems   Constitutional: Negative for chills and fever.   HENT: Negative for hearing loss.    Eyes: Negative for double vision, photophobia, pain and discharge.   Respiratory: Negative for shortness of breath and wheezing.    Cardiovascular: Negative for chest pain and " "palpitations.   Gastrointestinal: Negative for abdominal pain, diarrhea, nausea and vomiting.   Skin: Negative for rash.   Neurological: Positive for headaches. Negative for tingling, tremors, sensory change, speech change, focal weakness and weakness.   All other systems reviewed and are negative.       Objective:     Physical Exam:  /72 (BP Location: Right arm, Patient Position: Sitting, BP Cuff Size: Adult)   Pulse 88   Temp 36.8 °C (98.2 °F) (Temporal)   Resp 16   Ht 1.753 m (5' 9.02\")   Wt 79.4 kg (175 lb)   SpO2 96%  Body mass index is 25.83 kg/m².   Constitutional: Alert, no distress.  Skin: Warm, dry, good turgor, no rashes in visible areas.  Neck: Trachea midline, no masses, no thyromegaly. No cervical or supraclavicular lymphadenopathy.  Respiratory: Unlabored respiratory effort, lungs clear to auscultation, no wheezes, no rhonchi.  Cardiovascular: Normal S1, S2, no murmur, no edema.  Abdomen: Soft, non-tender, no masses, no hepatosplenomegaly.  Psych: Alert and oriented x3, normal affect and mood.  Neuro: Cranial nerves I through XII intact no focal motor/sensory deficits.    Assessment and Plan:     1. Ocular migraine  -Signs symptoms consistent with ocular migraine headache.  -Discussed conservative treatment measures including use of caffeine as needed.  -Also discussed preventative measures including decrease stress, increase hydration.  -Follow-up if symptoms persist or worsening.    2. Epidermal inclusion cyst  -At this time given continued irritation we will schedule for epidermal cyst inclusion removal at this time.    3. Dyslipidemia  -The 10-year ASCVD risk score ([place] DC Jr., et al., 2013) is: 3.4%  -Given low ASCVD risk score we will go ahead at this time and treat conservatively with proper diet and exercise.  Discussed low-cholesterol/low-fat diet, high in fiber, daily aerobic activity for 20 minutes.  -We will recheck labs again in 1 year.    4. Tobacco abuse  -TOBACCO: Spent 5 " minutes face-to-face with patient discussing nicotine addiction and smoking cessation as outlined in HPI as well as below:      Not Interested  Discussed with the patient that smoking cessation would be the best thing for their health. Patient is not ready to quit at this time. I offered my assistance with smoking cessation in the future.  -Patient at this time is most likely in precontemplation stage of quitting (states that his desire to quit smoking is at 6/10).  At this time I believe the best course of action is to allow patient time to develop appropriate and lasting goals as well as reasons to quit smoking.  Patient agrees he will follow-up as needed      Followup: Return if symptoms worsen or fail to improve.         PLEASE NOTE: This dictation was created using voice recognition software. I have made every reasonable attempt to correct obvious errors, but I expect that there are errors of grammar and possibly content that I did not discover before finalizing the note.

## 2020-03-30 ENCOUNTER — OFFICE VISIT (OUTPATIENT)
Dept: MEDICAL GROUP | Facility: LAB | Age: 40
End: 2020-03-30
Payer: COMMERCIAL

## 2020-03-30 VITALS
TEMPERATURE: 98.9 F | RESPIRATION RATE: 13 BRPM | BODY MASS INDEX: 25.92 KG/M2 | WEIGHT: 175 LBS | DIASTOLIC BLOOD PRESSURE: 80 MMHG | SYSTOLIC BLOOD PRESSURE: 124 MMHG | HEART RATE: 106 BPM | HEIGHT: 69 IN | OXYGEN SATURATION: 96 %

## 2020-03-30 DIAGNOSIS — D36.7 CYST, DERMOID, LEG, LEFT: ICD-10-CM

## 2020-03-30 PROCEDURE — 11402 EXC TR-EXT B9+MARG 1.1-2 CM: CPT | Performed by: FAMILY MEDICINE

## 2020-03-30 ASSESSMENT — ENCOUNTER SYMPTOMS
PALPITATIONS: 0
VOMITING: 0
WEIGHT LOSS: 0
CONSTIPATION: 0
FEVER: 0
CHILLS: 0
BLURRED VISION: 0
DIARRHEA: 0
ABDOMINAL PAIN: 0
NAUSEA: 0
SHORTNESS OF BREATH: 0
WHEEZING: 0

## 2020-03-30 NOTE — PROCEDURES
PRE-OP DIAGNOSIS: lipoma  POST-OP DIAGNOSIS: Same   PROCEDURE: lipoma removal  Performing Physician: Kavon Mcfadden        PROCEDURE:   Anesthesia: 2% Lidocaine w/ epinephrine     Procedure: Timeout procedure was performed prior to initiating procedure to be sure of right patient and right location.  The area surrounding the skin lesion was prepared and draped in the usual sterile manner and then was anesthetized. Skin incision was made in a linear fashion with #11 blade.  The incision was done superiorly then inferiorly to a full thickness and to the skin down to the cyst. The cyst was detached of the surrounding structure with the help of blunt dissection. Hemostasis was assured. The patient tolerated the procedure well.     Closure: Closure was completed with Dermabond     Followup: The patient tolerated the procedure well without complications.  Standard post-procedure care is explained and return precautions are given.

## 2020-03-30 NOTE — PROGRESS NOTES
"Subjective:   Gadiel Edgar is a 39 y.o. male here today for   Chief Complaint   Patient presents with   • Cyst     Removal        #Epidermal inclusion cyst:  -Patient here to discuss possible cyst removal.  He states that he has had cyst on the left upper thigh, at junction of thigh and glutes for several years.  He works as a  is constantly sitting.  It is caused a lot of irritation, strain given his job and is requesting removal of the cyst at this time.  -Denies any fevers, chills, swelling, tenderness to touch of the area, heat to touch of the area.    No Known Allergies      Current medicines (including changes today)  Current Outpatient Medications   Medication Sig Dispense Refill   • albuterol 108 (90 Base) MCG/ACT Aero Soln inhalation aerosol Inhale 2 Puffs by mouth every 6 hours as needed. 8.5 g 0   • Multiple Vitamin (MULTI-VITAMIN DAILY PO) Take  by mouth.       No current facility-administered medications for this visit.      He  has no past medical history on file.    ROS   Review of Systems   Constitutional: Negative for chills, fever and weight loss.   Eyes: Negative for blurred vision.   Respiratory: Negative for shortness of breath and wheezing.    Cardiovascular: Negative for chest pain and palpitations.   Gastrointestinal: Negative for abdominal pain, constipation, diarrhea, nausea and vomiting.   Skin: Negative for rash.   All other systems reviewed and are negative.       Objective:     Physical Exam:  /80 (BP Location: Right arm, Patient Position: Sitting, BP Cuff Size: Adult)   Pulse (!) 106   Temp 37.2 °C (98.9 °F) (Temporal)   Resp 13   Ht 1.753 m (5' 9.02\")   Wt 79.4 kg (175 lb)   SpO2 96%  Body mass index is 25.83 kg/m².  Constitutional: Alert, no distress.  Skin: Warm, dry, good turgor, no rashes in visible areas.  Small, 1.5 cm cyst located at the border of upper extremity and gluteal fold of left leg.  Cyst was mobile, nontender.  Respiratory: " Unlabored respiratory effort, no audible wheezing.  Psych: Alert and oriented x3, normal affect and mood.    Assessment and Plan:     1. Cyst, dermoid, leg, left  -Given that cyst is symptomatic and continues to worsen, we will go ahead with removal of cyst at this time (see procedure note).  -Before procedure consent was given.  Discussed with patient risks, benefits, alternatives.  Patient agrees to procedure at this time.  -Patient will will follow-up if needed, return precautions were given.      Followup: Return if symptoms worsen or fail to improve.         PLEASE NOTE: This dictation was created using voice recognition software. I have made every reasonable attempt to correct obvious errors, but I expect that there are errors of grammar and possibly content that I did not discover before finalizing the note.

## 2020-04-04 ENCOUNTER — OFFICE VISIT (OUTPATIENT)
Dept: URGENT CARE | Facility: CLINIC | Age: 40
End: 2020-04-04
Payer: COMMERCIAL

## 2020-04-04 VITALS
HEIGHT: 69 IN | RESPIRATION RATE: 16 BRPM | OXYGEN SATURATION: 96 % | SYSTOLIC BLOOD PRESSURE: 122 MMHG | WEIGHT: 170 LBS | TEMPERATURE: 97.3 F | BODY MASS INDEX: 25.18 KG/M2 | HEART RATE: 78 BPM | DIASTOLIC BLOOD PRESSURE: 86 MMHG

## 2020-04-04 DIAGNOSIS — J02.9 PHARYNGITIS, UNSPECIFIED ETIOLOGY: ICD-10-CM

## 2020-04-04 DIAGNOSIS — J30.2 SEASONAL ALLERGIES: ICD-10-CM

## 2020-04-04 LAB
INT CON NEG: NEGATIVE
INT CON POS: POSITIVE
S PYO AG THROAT QL: NEGATIVE

## 2020-04-04 PROCEDURE — 87880 STREP A ASSAY W/OPTIC: CPT | Performed by: PHYSICIAN ASSISTANT

## 2020-04-04 PROCEDURE — 99213 OFFICE O/P EST LOW 20 MIN: CPT | Mod: 25 | Performed by: PHYSICIAN ASSISTANT

## 2020-04-04 RX ORDER — DEXAMETHASONE SODIUM PHOSPHATE 10 MG/ML
10 INJECTION INTRAMUSCULAR; INTRAVENOUS ONCE
Status: COMPLETED | OUTPATIENT
Start: 2020-04-04 | End: 2020-04-04

## 2020-04-04 RX ADMIN — DEXAMETHASONE SODIUM PHOSPHATE 10 MG: 10 INJECTION INTRAMUSCULAR; INTRAVENOUS at 14:30

## 2020-04-04 ASSESSMENT — ENCOUNTER SYMPTOMS
SHORTNESS OF BREATH: 0
FEVER: 0
FOCAL WEAKNESS: 0
CHILLS: 0
HEADACHES: 0
COUGH: 0
ABDOMINAL PAIN: 0
TINGLING: 0
EYE PAIN: 0
SORE THROAT: 1
NAUSEA: 0
DIARRHEA: 0
VOMITING: 0

## 2020-04-04 NOTE — PATIENT INSTRUCTIONS
". Pharyngitis, unspecified etiology  - dexamethasone (DECADRON) injection (check route below) 10 mg    2. Seasonal allergies  As we discussed your clinical condition would benefit from over-the-counter remedies:    FLONASE:  You may consider intranasal steroids such as fluticasone (brand name Flonase), or any other generic equivalent.  Please take according to package directions.  This steroid will help reduce inflammation of your sinuses.    ANTIHISTAMINES  You may take a nonsedating antihistamine like Zyrtec, or Claritin.  This will help \"dry you out\" and reduce the amount of nasal inflammation.    TYLENOL/IBUPROFEN  Consider taking over-the-counter Tylenol and ibuprofen to help with discomfort and pain.    "

## 2020-04-04 NOTE — PROGRESS NOTES
"Subjective:   Gadiel Edgar is a 39 y.o. male who presents for Pharyngitis (x 1 week No travel, No direct contact with COVID 19 patient)      This 39-year-old male presents with around 5 or 6 days of sore throat.  He is a smoker and has allergies and has been working outside with lots of wind and pollen this week and his throat is bothering him significantly.  He has not been taking any over-the-counter remedies yet he is concerned that the symptoms have gone on so long.  He denies fever, chest pain or shortness of breath but he does get a dry cough occasionally.  He has no history of recurrent strep throat and no contacts with people that have strep throat that he knows of.      Review of Systems   Constitutional: Negative for chills and fever.   HENT: Positive for congestion and sore throat. Negative for ear pain.    Eyes: Negative for pain.   Respiratory: Negative for cough and shortness of breath.    Cardiovascular: Negative for chest pain.   Gastrointestinal: Negative for abdominal pain, diarrhea, nausea and vomiting.   Genitourinary: Negative for dysuria.   Skin: Negative for rash.   Neurological: Negative for tingling, focal weakness and headaches.       Medications, Allergies, and current problem list reviewed today in Epic.     Objective:     /86 (BP Location: Left arm, Patient Position: Sitting, BP Cuff Size: Adult long)   Pulse 78   Temp 36.3 °C (97.3 °F) (Temporal)   Resp 16   Ht 1.753 m (5' 9\")   Wt 77.1 kg (170 lb)   SpO2 96%     Physical Exam  Vitals signs reviewed.   Constitutional:       Appearance: Normal appearance.   HENT:      Head: Normocephalic and atraumatic.      Right Ear: External ear normal.      Left Ear: External ear normal.      Nose: Nose normal.      Mouth/Throat:      Mouth: Mucous membranes are moist.   Eyes:      Conjunctiva/sclera: Conjunctivae normal.   Cardiovascular:      Rate and Rhythm: Normal rate.   Pulmonary:      Effort: Pulmonary effort is normal. " "  Skin:     General: Skin is warm and dry.      Capillary Refill: Capillary refill takes less than 2 seconds.   Neurological:      Mental Status: He is alert and oriented to person, place, and time.         Assessment/Plan:     1. Pharyngitis, unspecified etiology  - dexamethasone (DECADRON) injection (check route below) 10 mg    2. Seasonal allergies  As we discussed your clinical condition would benefit from over-the-counter remedies:    FLONASE:  You may consider intranasal steroids such as fluticasone (brand name Flonase), or any other generic equivalent.  Please take according to package directions.  This steroid will help reduce inflammation of your sinuses.    ANTIHISTAMINES  You may take a nonsedating antihistamine like Zyrtec, or Claritin.  This will help \"dry you out\" and reduce the amount of nasal inflammation.    TYLENOL/IBUPROFEN  Consider taking over-the-counter Tylenol and ibuprofen to help with discomfort and pain.    According to the [employer] Criteria:   0 point for fever >100.4 F  1 point for exudate/swelling on tonsils  1 point for tender/swollen anterior lymph nodes  1 point for absence of cough  0 point for age (3-14 yrs: +1, 15-44yrs: 0, >45yrs: -1)    According to their score of 3 I counseled the patient that no abx indicated given (-) test was indicated.    Score 0-1: No further testing/abx  Score 2: Optional strep testing/culture  Score 3: Consider rapid strep testing/culture  Score >=4: Empiric Abx appropriate, may consider testing/culture.         Differential diagnosis, natural history, supportive care, and indications for immediate follow-up discussed.    Advised the patient to follow-up with the primary care physician for recheck, reevaluation, and consideration of further management.  "

## 2020-04-16 ENCOUNTER — OFFICE VISIT (OUTPATIENT)
Dept: MEDICAL GROUP | Facility: LAB | Age: 40
End: 2020-04-16
Payer: COMMERCIAL

## 2020-04-16 DIAGNOSIS — J02.9 SORE THROAT: ICD-10-CM

## 2020-04-16 PROCEDURE — 99441 PR PHYSICIAN TELEPHONE EVALUATION 5-10 MIN: CPT | Mod: 95,CR | Performed by: FAMILY MEDICINE

## 2020-04-16 NOTE — PROGRESS NOTES
Telephone Appointment Visit   As a means of avoiding spread of COVID-19, this visit is being conducted by telephone. This telephone visit was initiated by the patient and they verbally consented.    Time at start of call: 3:40    Reason for Call:  Symptom Follow-up    Patient Comments / History:   #Sore throat   -Patient was recently diagnosed with pharyngitis and urgent care visit on 4/4/2020.  At that time rapid strep was completed, found to be negative.  -Patient states that he continues to have a sore throat.  He is undergoing conservative treatment with NSAIDs, salt water gargles which she states has not helped symptoms.  He continues to have a bad sore throat; however, denies any fevers, chills, cough, shortness of breath, difficulty breathing.  -He states his sore throat is bad enough where it is begin to hurt his neck.  -No previous sick contacts that he is aware of.    Labs / Images Reviewed   Rapid strep completed on 4/4/2020: Negative.    Assessment and Plan:     1. Sore throat  -Most likely viral pharyngitis; however, because symptoms continue with neck pain with check heterophile test at this time.   -Return precautions given. Will call patients with results.   - MONONUCLEOSIS TEST QUAL; Future      Follow-up: Return if symptoms worsen or fail to improve.    Time at end of call: 3:45  Total Time Spent: 5-10 minutes    Kavon Mcfadden M.D.

## 2020-04-17 ENCOUNTER — HOSPITAL ENCOUNTER (OUTPATIENT)
Dept: LAB | Facility: MEDICAL CENTER | Age: 40
End: 2020-04-17
Attending: FAMILY MEDICINE
Payer: COMMERCIAL

## 2020-04-17 DIAGNOSIS — J02.9 SORE THROAT: ICD-10-CM

## 2020-04-17 PROCEDURE — 36415 COLL VENOUS BLD VENIPUNCTURE: CPT

## 2020-04-17 PROCEDURE — 86308 HETEROPHILE ANTIBODY SCREEN: CPT

## 2020-04-18 LAB — HETEROPH AB SER QL: NEGATIVE

## 2020-04-23 ENCOUNTER — OFFICE VISIT (OUTPATIENT)
Dept: MEDICAL GROUP | Facility: LAB | Age: 40
End: 2020-04-23
Payer: COMMERCIAL

## 2020-04-23 VITALS
SYSTOLIC BLOOD PRESSURE: 112 MMHG | HEART RATE: 88 BPM | OXYGEN SATURATION: 97 % | DIASTOLIC BLOOD PRESSURE: 78 MMHG | RESPIRATION RATE: 12 BRPM | BODY MASS INDEX: 26.36 KG/M2 | HEIGHT: 69 IN | TEMPERATURE: 97.2 F | WEIGHT: 178 LBS

## 2020-04-23 DIAGNOSIS — J35.8 TONSIL STONE: ICD-10-CM

## 2020-04-23 PROCEDURE — 99213 OFFICE O/P EST LOW 20 MIN: CPT | Performed by: FAMILY MEDICINE

## 2020-04-23 ASSESSMENT — ENCOUNTER SYMPTOMS
SHORTNESS OF BREATH: 0
CHILLS: 0
FEVER: 0
WHEEZING: 0
SORE THROAT: 0
SINUS PAIN: 0
COUGH: 0

## 2020-04-23 NOTE — PROGRESS NOTES
"Subjective:   Gadiel Edgar is a 39 y.o. male here today for   Chief Complaint   Patient presents with   • Follow-Up     Tonsil issues, feeling like their is a blockage, coughed up a white stones build up mineral.      #tonsil issues   -2 days ago \"coughed up\" white tiny stone, concerned it is a tonsil stone  -This occurred after he used still saline and [place] pot to \"clean out my sinuses\".  -He states that he denies any sore throat, fevers, chills, cough, shortness of breath.  -He does state at this time he is having a new foreign body sensation with swallowing occasionally.  This will last only few seconds and then resolved.  -He previously had a sore throat because of this he had stopped taking Flonase.  Is taking Zyrtec at this time.  -Does have associated allergic symptoms including itchy, watery eyes, occasional sneezing  -Continues to smoke quarter pack a day.    No Known Allergies      Current medicines (including changes today)  Current Outpatient Medications   Medication Sig Dispense Refill   • Multiple Vitamin (MULTI-VITAMIN DAILY PO) Take  by mouth.       No current facility-administered medications for this visit.      He  has no past medical history on file.    ROS   Review of Systems   Constitutional: Negative for chills and fever.   HENT: Negative for congestion, sinus pain and sore throat.    Respiratory: Negative for cough, shortness of breath and wheezing.    Skin: Negative for rash.      Objective:     Physical Exam:  /78 (BP Location: Right arm, Patient Position: Sitting, BP Cuff Size: Adult)   Pulse 88   Temp 36.2 °C (97.2 °F) (Temporal)   Resp 12   Ht 1.753 m (5' 9\")   Wt 80.7 kg (178 lb)   SpO2 97%  Body mass index is 26.29 kg/m².   Constitutional: Alert, no distress.  Skin: Warm, dry, good turgor, no rashes in visible areas.  Eye: Equal, round and reactive, conjunctiva clear, lids normal.  ENMT: TM's clear bilaterally, lips without lesions, good dentition, oropharynx " clear.  No tonsillar hypertrophy, exudates noted.  Neck: Trachea midline, no masses, no thyromegaly. No cervical or supraclavicular lymphadenopathy.  Respiratory: Unlabored respiratory effort, lungs clear to auscultation, no wheezes, no rhonchi.  Cardiovascular: Normal S1, S2, no murmur, no edema.  Abdomen: Soft, non-tender, no masses, no hepatosplenomegaly.  Psych: Alert and oriented x3, normal affect and mood.    Assessment and Plan:     1. Tonsil stone  -Signs and symptoms suggest possible tonsillar stone; however, physical examination is completely benign at this time.  There is also concerned this could be from his sinuses or other discharge given his seasonal allergies and smoking history.  -Continue treat allergies with Flonase, Zyrtec.  -Again cautioned patient about the risks of smoking and urged him to stop smoking at this time.  -Return precautions were given.  Follow-up as needed.      Followup: No follow-ups on file.         PLEASE NOTE: This dictation was created using voice recognition software. I have made every reasonable attempt to correct obvious errors, but I expect that there are errors of grammar and possibly content that I did not discover before finalizing the note.

## 2020-07-13 ENCOUNTER — OFFICE VISIT (OUTPATIENT)
Dept: URGENT CARE | Facility: CLINIC | Age: 40
End: 2020-07-13
Payer: COMMERCIAL

## 2020-07-13 ENCOUNTER — HOSPITAL ENCOUNTER (OUTPATIENT)
Facility: MEDICAL CENTER | Age: 40
End: 2020-07-13
Attending: PHYSICIAN ASSISTANT
Payer: COMMERCIAL

## 2020-07-13 VITALS
OXYGEN SATURATION: 97 % | BODY MASS INDEX: 26.66 KG/M2 | TEMPERATURE: 98.8 F | HEART RATE: 68 BPM | WEIGHT: 180 LBS | DIASTOLIC BLOOD PRESSURE: 68 MMHG | RESPIRATION RATE: 16 BRPM | SYSTOLIC BLOOD PRESSURE: 108 MMHG | HEIGHT: 69 IN

## 2020-07-13 DIAGNOSIS — Z20.822 CLOSE EXPOSURE TO COVID-19 VIRUS: ICD-10-CM

## 2020-07-13 DIAGNOSIS — J06.0 SORE THROAT AND LARYNGITIS: ICD-10-CM

## 2020-07-13 PROCEDURE — 99214 OFFICE O/P EST MOD 30 MIN: CPT | Mod: CS | Performed by: PHYSICIAN ASSISTANT

## 2020-07-13 PROCEDURE — U0003 INFECTIOUS AGENT DETECTION BY NUCLEIC ACID (DNA OR RNA); SEVERE ACUTE RESPIRATORY SYNDROME CORONAVIRUS 2 (SARS-COV-2) (CORONAVIRUS DISEASE [COVID-19]), AMPLIFIED PROBE TECHNIQUE, MAKING USE OF HIGH THROUGHPUT TECHNOLOGIES AS DESCRIBED BY CMS-2020-01-R: HCPCS

## 2020-07-13 RX ORDER — DEXAMETHASONE 2 MG/1
2 TABLET ORAL 2 TIMES DAILY
Qty: 6 TAB | Refills: 0 | Status: SHIPPED | OUTPATIENT
Start: 2020-07-13 | End: 2020-07-16

## 2020-07-13 RX ORDER — FLUTICASONE PROPIONATE 50 MCG
1 SPRAY, SUSPENSION (ML) NASAL DAILY
Qty: 16 G | Refills: 0 | Status: SHIPPED | OUTPATIENT
Start: 2020-07-13

## 2020-07-13 SDOH — HEALTH STABILITY: MENTAL HEALTH: HOW MANY STANDARD DRINKS CONTAINING ALCOHOL DO YOU HAVE ON A TYPICAL DAY?: NOT ASKED

## 2020-07-13 SDOH — HEALTH STABILITY: MENTAL HEALTH: HOW OFTEN DO YOU HAVE 6 OR MORE DRINKS ON ONE OCCASION?: NOT ASKED

## 2020-07-13 SDOH — HEALTH STABILITY: MENTAL HEALTH: HOW OFTEN DO YOU HAVE A DRINK CONTAINING ALCOHOL?: NOT ASKED

## 2020-07-13 ASSESSMENT — ENCOUNTER SYMPTOMS
MYALGIAS: 1
SORE THROAT: 1
SWOLLEN GLANDS: 0
VOMITING: 0
NAUSEA: 0
CHILLS: 0
TROUBLE SWALLOWING: 0
WHEEZING: 0
EYE DISCHARGE: 0
EYE REDNESS: 0
DIARRHEA: 0
SHORTNESS OF BREATH: 0
FEVER: 0
COUGH: 1
HOARSE VOICE: 1
SPUTUM PRODUCTION: 0
ABDOMINAL PAIN: 0

## 2020-07-14 LAB — COVID ORDER STATUS COVID19: NORMAL

## 2020-07-14 NOTE — PROGRESS NOTES
"Subjective:      Gadiel Edgar is a 39 y.o. male who presents with Sore Throat (loss of voice, dry cough x 4 days)            Pharyngitis    This is a new problem. Episode onset: 4 days ago. The problem has been waxing and waning. There has been no fever. Associated symptoms include congestion, coughing, a hoarse voice and a plugged ear sensation. Pertinent negatives include no abdominal pain, diarrhea, ear pain, shortness of breath, swollen glands, trouble swallowing or vomiting. Associated symptoms comments: Reports dry cough.   . He has tried nothing for the symptoms.   He does report hx of seasonal allergies- of which he has had increased sneezing.   He is also concerned as he reports many co-workers reported that they were positive for COVID.     Review of Systems   Constitutional: Positive for malaise/fatigue. Negative for chills and fever.   HENT: Positive for congestion, hoarse voice and sore throat. Negative for ear pain and trouble swallowing.    Eyes: Negative for discharge and redness.   Respiratory: Positive for cough. Negative for sputum production, shortness of breath and wheezing.    Gastrointestinal: Negative for abdominal pain, diarrhea, nausea and vomiting.   Musculoskeletal: Positive for myalgias.   Skin: Negative for rash.   All other systems reviewed and are negative.         Objective:     /68 (BP Location: Left arm, Patient Position: Sitting, BP Cuff Size: Adult)   Pulse 68   Temp 37.1 °C (98.8 °F) (Temporal)   Resp 16   Ht 1.753 m (5' 9\")   Wt 81.6 kg (180 lb)   SpO2 97%   BMI 26.58 kg/m²    PMH:  has no past medical history on file.  MEDS: Reviewed .   ALLERGIES: No Known Allergies  SURGHX: History reviewed. No pertinent surgical history.  SOCHX:  reports that he has been smoking cigars and cigarettes. He has been smoking about 0.25 packs per day. His smokeless tobacco use includes chew. He reports previous alcohol use. He reports previous drug use.  FH: Family history " was reviewed, no pertinent findings to report    Physical Exam  Vitals signs reviewed.   Constitutional:       Appearance: Normal appearance. He is well-developed.   HENT:      Head: Normocephalic and atraumatic.      Ears:      Comments: Bilateral clear middle ear effusions.     Nose:      Comments: Rhinorrhea noted.     Mouth/Throat:      Comments: Posterior oropharynx is erythematous, positive postnasal drainage.  No evidence of exudate.  Eyes:      Conjunctiva/sclera: Conjunctivae normal.      Pupils: Pupils are equal, round, and reactive to light.   Neck:      Musculoskeletal: Normal range of motion and neck supple.   Cardiovascular:      Rate and Rhythm: Normal rate and regular rhythm.      Heart sounds: No murmur.   Pulmonary:      Effort: Pulmonary effort is normal. No respiratory distress.      Breath sounds: Normal breath sounds.   Musculoskeletal: Normal range of motion.      Right lower leg: No edema.      Left lower leg: No edema.   Lymphadenopathy:      Cervical: No cervical adenopathy.   Skin:     General: Skin is warm.      Findings: No rash.   Neurological:      Mental Status: He is alert and oriented to person, place, and time.   Psychiatric:         Mood and Affect: Mood normal.         Behavior: Behavior normal.         Thought Content: Thought content normal.                 Assessment/Plan:       1. Sore throat and laryngitis  - COVID/SARS COV-2 PCR; Future  - fluticasone (FLONASE) 50 MCG/ACT nasal spray; Spray 1 Spray in nose every day.  Dispense: 16 g; Refill: 0  - dexamethasone (DECADRON) 2 MG tablet; Take 1 Tab by mouth 2 times a day for 3 days.  Dispense: 6 Tab; Refill: 0    2. Close exposure to COVID-19 virus  - COVID/SARS COV-2 PCR; Future      Will test for COVID- 19 today.   Pt. Is well appearing at this time.   Appropriate PPE worn at all times by provider.   Pt. Had face mask on throughout entirety of the visit other than oropharyngeal examination today.   Pt was clearly instructed  to remain at home until further instructed by our clinic or the State for further instructions.   Will provide symptomatic tx at this time.     Will follow up with this patient as results return.   Patient and/or guardian given precautionary s/sx that mandate immediate follow up and evaluation in the ED. Advised of risks of not doing so.  DDX, Supportive care, and indications for immediate follow-up discussed with patient.    Instructed to return to clinic or nearest emergency department if we are not available for any change in condition, further concerns, or worsening of symptoms.    The patient and/or guardian demonstrated a good understanding and agreed with the treatment plan.    Please note that this dictation was created using voice recognition software. I have made every reasonable attempt to correct obvious errors, but I expect that there are errors of grammar and possibly content that I did not discover before finalizing the note.

## 2020-07-15 LAB
SARS-COV-2 RNA RESP QL NAA+PROBE: NOTDETECTED
SPECIMEN SOURCE: NORMAL

## 2020-10-19 ENCOUNTER — APPOINTMENT (OUTPATIENT)
Dept: RADIOLOGY | Facility: IMAGING CENTER | Age: 40
End: 2020-10-19
Attending: PHYSICIAN ASSISTANT
Payer: COMMERCIAL

## 2020-10-19 ENCOUNTER — OFFICE VISIT (OUTPATIENT)
Dept: URGENT CARE | Facility: CLINIC | Age: 40
End: 2020-10-19
Payer: COMMERCIAL

## 2020-10-19 VITALS
SYSTOLIC BLOOD PRESSURE: 118 MMHG | TEMPERATURE: 96.9 F | HEART RATE: 61 BPM | DIASTOLIC BLOOD PRESSURE: 80 MMHG | BODY MASS INDEX: 26.22 KG/M2 | OXYGEN SATURATION: 98 % | RESPIRATION RATE: 18 BRPM | HEIGHT: 69 IN | WEIGHT: 177 LBS

## 2020-10-19 DIAGNOSIS — S39.012A STRAIN OF LUMBAR REGION, INITIAL ENCOUNTER: ICD-10-CM

## 2020-10-19 DIAGNOSIS — S39.92XA INJURY OF LOW BACK, INITIAL ENCOUNTER: ICD-10-CM
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
## 2020-10-19 DIAGNOSIS — S16.1XXA STRAIN OF NECK MUSCLE, INITIAL ENCOUNTER: ICD-10-CM

## 2020-10-19 PROCEDURE — 99214 OFFICE O/P EST MOD 30 MIN: CPT | Performed by: PHYSICIAN ASSISTANT

## 2020-10-19 PROCEDURE — 72100 X-RAY EXAM L-S SPINE 2/3 VWS: CPT | Mod: TC,FY | Performed by: PHYSICIAN ASSISTANT

## 2020-10-19 RX ORDER — KETOROLAC TROMETHAMINE 30 MG/ML
30 INJECTION, SOLUTION INTRAMUSCULAR; INTRAVENOUS ONCE
Status: COMPLETED | OUTPATIENT
Start: 2020-10-19 | End: 2020-10-19

## 2020-10-19 RX ORDER — CYCLOBENZAPRINE HCL 5 MG
5-10 TABLET ORAL
Qty: 20 TAB | Refills: 0 | Status: SHIPPED | OUTPATIENT
Start: 2020-10-19 | End: 2020-11-05 | Stop reason: SDUPTHER

## 2020-10-19 RX ORDER — COVID-19 ANTIGEN TEST
2 KIT MISCELLANEOUS PRN
COMMUNITY
End: 2023-05-18

## 2020-10-19 RX ADMIN — KETOROLAC TROMETHAMINE 30 MG: 30 INJECTION, SOLUTION INTRAMUSCULAR; INTRAVENOUS at 10:51

## 2020-10-19 ASSESSMENT — ENCOUNTER SYMPTOMS
DOUBLE VISION: 0
EYE REDNESS: 0
HEADACHES: 0
NECK PAIN: 1
SHORTNESS OF BREATH: 0
NAUSEA: 0
BACK PAIN: 1
COUGH: 0
SORE THROAT: 0
BLURRED VISION: 0
EYE DISCHARGE: 0
FEVER: 0
VOMITING: 0

## 2020-10-19 NOTE — LETTER
[facility]  [address]  [place] [address]     October 19, 2020    Patient: Gadiel Edgar   YOB: 1980   Date of Visit: 10/19/2020       To Whom It May Concern:    Gadiel Edgar was seen and treated in our department on 10/19/2020. Please excuse him from work 11/19-11/21.     Sincerely,     Lorena Rodas P.A.-C.

## 2020-10-23 ENCOUNTER — HOSPITAL ENCOUNTER (OUTPATIENT)
Dept: LAB | Facility: MEDICAL CENTER | Age: 40
End: 2020-10-23
Attending: PATHOLOGY
Payer: COMMERCIAL

## 2020-10-23 LAB
COVID ORDER STATUS COVID19: NORMAL
SARS-COV-2 RNA RESP QL NAA+PROBE: NOTDETECTED
SPECIMEN SOURCE: NORMAL

## 2020-10-23 PROCEDURE — U0003 INFECTIOUS AGENT DETECTION BY NUCLEIC ACID (DNA OR RNA); SEVERE ACUTE RESPIRATORY SYNDROME CORONAVIRUS 2 (SARS-COV-2) (CORONAVIRUS DISEASE [COVID-19]), AMPLIFIED PROBE TECHNIQUE, MAKING USE OF HIGH THROUGHPUT TECHNOLOGIES AS DESCRIBED BY CMS-2020-01-R: HCPCS

## 2020-10-23 PROCEDURE — C9803 HOPD COVID-19 SPEC COLLECT: HCPCS
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
## 2020-11-05 ENCOUNTER — OFFICE VISIT (OUTPATIENT)
Dept: MEDICAL GROUP | Facility: LAB | Age: 40
End: 2020-11-05
Payer: COMMERCIAL

## 2020-11-05 VITALS
DIASTOLIC BLOOD PRESSURE: 70 MMHG | HEART RATE: 84 BPM | RESPIRATION RATE: 13 BRPM | TEMPERATURE: 97.9 F | HEIGHT: 69 IN | OXYGEN SATURATION: 97 % | SYSTOLIC BLOOD PRESSURE: 106 MMHG | WEIGHT: 176 LBS | BODY MASS INDEX: 26.07 KG/M2

## 2020-11-05 DIAGNOSIS — S39.012D STRAIN OF LUMBAR REGION, SUBSEQUENT ENCOUNTER: ICD-10-CM

## 2020-11-05 PROCEDURE — 99213 OFFICE O/P EST LOW 20 MIN: CPT | Performed by: FAMILY MEDICINE

## 2020-11-05 RX ORDER — CYCLOBENZAPRINE HCL 5 MG
5-10 TABLET ORAL
Qty: 20 TAB | Refills: 0 | Status: SHIPPED | OUTPATIENT
Start: 2020-11-05 | End: 2022-02-04

## 2020-11-05 ASSESSMENT — ENCOUNTER SYMPTOMS
ABDOMINAL PAIN: 0
VOMITING: 0
DIARRHEA: 0
FOCAL WEAKNESS: 1
FEVER: 0
CHILLS: 0
NAUSEA: 0
BLURRED VISION: 0
BACK PAIN: 1
WHEEZING: 0
PALPITATIONS: 0
SENSORY CHANGE: 0
SPEECH CHANGE: 0
SHORTNESS OF BREATH: 0
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
## 2022-01-31 ENCOUNTER — OFFICE VISIT (OUTPATIENT)
Dept: MEDICAL GROUP | Facility: LAB | Age: 42
End: 2022-01-31
Payer: COMMERCIAL

## 2022-01-31 VITALS
HEART RATE: 84 BPM | RESPIRATION RATE: 13 BRPM | WEIGHT: 175 LBS | OXYGEN SATURATION: 96 % | BODY MASS INDEX: 25.92 KG/M2 | HEIGHT: 69 IN | TEMPERATURE: 98 F | SYSTOLIC BLOOD PRESSURE: 110 MMHG | DIASTOLIC BLOOD PRESSURE: 72 MMHG

## 2022-01-31 DIAGNOSIS — Z23 NEED FOR VACCINATION: ICD-10-CM

## 2022-01-31 DIAGNOSIS — R10.9 FLANK PAIN: ICD-10-CM

## 2022-01-31 DIAGNOSIS — Z00.00 ANNUAL PHYSICAL EXAM: ICD-10-CM

## 2022-01-31 DIAGNOSIS — Z13.6 SCREENING FOR CARDIOVASCULAR CONDITION: ICD-10-CM

## 2022-01-31 PROCEDURE — 90686 IIV4 VACC NO PRSV 0.5 ML IM: CPT | Performed by: FAMILY MEDICINE

## 2022-01-31 PROCEDURE — 90471 IMMUNIZATION ADMIN: CPT | Performed by: FAMILY MEDICINE

## 2022-01-31 PROCEDURE — 99396 PREV VISIT EST AGE 40-64: CPT | Mod: 25 | Performed by: FAMILY MEDICINE

## 2022-01-31 ASSESSMENT — ENCOUNTER SYMPTOMS
NERVOUS/ANXIOUS: 0
FEVER: 0
NAUSEA: 0
CHILLS: 0
WHEEZING: 0
DIZZINESS: 0
DIARRHEA: 0
BLURRED VISION: 0
DEPRESSION: 0
HEADACHES: 0
VOMITING: 0
PALPITATIONS: 0
SHORTNESS OF BREATH: 0
ABDOMINAL PAIN: 0

## 2022-01-31 NOTE — PROGRESS NOTES
"Subjective:   Gadiel Edgar is a 41 y.o. male here today for   Chief Complaint   Patient presents with   • Flank Pain     X waking up with side pain, soreness, 2 weeks ago was streching felt spomething rip, next day felt like he got hit with  abasball at, still feeling th doreness.        #Annual   -Reviewed all past medical history, family history, social history.  -Reviewed all screening/vaccinations: Due for influenza vaccine, labs including lipid profile.  -Diet and Exercise: No real diet, exercise regimen.  -Tobacco, alcohol, recreational drug use: Smoking 4 to 5 cigarettes daily, alcohol use.  States he will go through the fifth of to daily every 7 to 10 days.  -Sexually active: Monogamous with female partner.    #Flank pain:  -Approximately 2 weeks ago he began having sharp, intermittent left side/flank pain.  He states he only filled the morning when stretching.  It only last for a few seconds to minutes and then resolved.  He states he originally had flank pain that occurred 2 months ago.  He states that this recent flank pain started 2 weeks ago after stretching and feeling what he described as \"something rip\".  Pain is nonradiating.  Denies any abdominal pain, nausea, vomiting, diarrhea, dysuria, hematuria.    No Known Allergies      Current medicines (including changes today)  Current Outpatient Medications   Medication Sig Dispense Refill   • cyclobenzaprine (FLEXERIL) 5 mg tablet Take 1-2 Tabs by mouth at bedtime as needed. 20 Tab 0   • Naproxen Sodium (ALEVE) 220 MG Cap Take 2 Caps by mouth as needed.     • fluticasone (FLONASE) 50 MCG/ACT nasal spray Spray 1 Spray in nose every day. 16 g 0   • Multiple Vitamin (MULTI-VITAMIN DAILY PO) Take  by mouth.       No current facility-administered medications for this visit.     He  has no past medical history on file.    ROS   Review of Systems   Constitutional: Negative for chills and fever.   HENT: Negative for hearing loss.    Eyes: Negative for " "blurred vision.   Respiratory: Negative for shortness of breath and wheezing.    Cardiovascular: Negative for chest pain and palpitations.   Gastrointestinal: Negative for abdominal pain, diarrhea, nausea and vomiting.   Neurological: Negative for dizziness and headaches.   Psychiatric/Behavioral: Negative for depression. The patient is not nervous/anxious.       Objective:     Physical Exam:  /72   Pulse 84   Temp 36.7 °C (98 °F) (Temporal)   Resp 13   Ht 1.75 m (5' 8.9\")   Wt 79.4 kg (175 lb)   SpO2 96%  Body mass index is 25.92 kg/m².   Constitutional: Alert, no distress.  Skin: Warm, dry, good turgor, no rashes in visible areas.  Eye: Equal, round and reactive, conjunctiva clear, lids normal.  ENMT: TM's clear bilaterally, lips without lesions, good dentition, oropharynx clear.  Neck: Trachea midline, no masses, no thyromegaly. No cervical or supraclavicular lymphadenopathy.  Respiratory: Unlabored respiratory effort, lungs clear to auscultation, no wheezes, no rhonchi.  Cardiovascular: Normal S1, S2, no murmur, no edema.  Abdomen: Soft, non-tender, no masses, no hepatosplenomegaly.  Slight tenderness palpation of right flank just beneath the rib cage.  Slight pain with side bend on left side.  Psych: Alert and oriented x3, normal affect and mood.    Assessment and Plan:     1. Annual physical exam  -All questions concerns were answered at this time.  -Vaccinations/screenings needed at this time: Complete influenza vaccine today, labs.  -Labs reviewed, no concerns, repeat labs as below.  -Discussed the importance of a healthy, Mediterranean style diet, routine exercise regimen.  -Discussed general safety measures including seatbelts, helmets, avoidance of smoking, sunscreen, hydration.  -Follow-up for general physical exam on a yearly basis, sooner if needed.  - CBC WITHOUT DIFFERENTIAL; Future  - Comp Metabolic Panel; Future    2. Flank pain  -Flank pain secondary to chronic muscle strain, most " likely of the oblique or transverse abdominis muscles.  Discussed conservative treatment measures with regimented NSAIDs for 10 days.  Home physical therapy exercises are given.  Also discussed the importance of rest and to be careful over stretching muscle.  Patient will follow-up in 1 to 2 months if no improvement seen.    3. Need for vaccination  -Patient was agreeable to receiving the influenza vaccine in clinic today after discussion of potential risks, benefits, and side effects. Vaccine was administered without adverse effects.  - INFLUENZA VACCINE QUAD INJ (PF)    4. Screening for cardiovascular condition  - Lipid Profile; Future      Followup: No follow-ups on file.         PLEASE NOTE: This dictation was created using voice recognition software. I have made every reasonable attempt to correct obvious errors, but I expect that there are errors of grammar and possibly content that I did not discover before finalizing the note.

## 2022-02-04 ENCOUNTER — OFFICE VISIT (OUTPATIENT)
Dept: URGENT CARE | Facility: CLINIC | Age: 42
End: 2022-02-04
Payer: COMMERCIAL

## 2022-02-04 ENCOUNTER — HOSPITAL ENCOUNTER (OUTPATIENT)
Facility: MEDICAL CENTER | Age: 42
End: 2022-02-04
Attending: PHYSICIAN ASSISTANT
Payer: COMMERCIAL

## 2022-02-04 VITALS
SYSTOLIC BLOOD PRESSURE: 130 MMHG | HEIGHT: 69 IN | WEIGHT: 175 LBS | OXYGEN SATURATION: 100 % | RESPIRATION RATE: 16 BRPM | TEMPERATURE: 97.7 F | BODY MASS INDEX: 25.92 KG/M2 | DIASTOLIC BLOOD PRESSURE: 90 MMHG | HEART RATE: 64 BPM

## 2022-02-04 DIAGNOSIS — R05.9 COUGH: ICD-10-CM

## 2022-02-04 DIAGNOSIS — J02.9 SORE THROAT: ICD-10-CM

## 2022-02-04 DIAGNOSIS — J30.9 ALLERGIC RHINITIS, UNSPECIFIED SEASONALITY, UNSPECIFIED TRIGGER: ICD-10-CM

## 2022-02-04 LAB
INT CON NEG: NORMAL
INT CON POS: NORMAL
S PYO AG THROAT QL: NEGATIVE

## 2022-02-04 PROCEDURE — 99214 OFFICE O/P EST MOD 30 MIN: CPT | Performed by: PHYSICIAN ASSISTANT

## 2022-02-04 PROCEDURE — U0005 INFEC AGEN DETEC AMPLI PROBE: HCPCS

## 2022-02-04 PROCEDURE — 87880 STREP A ASSAY W/OPTIC: CPT | Performed by: PHYSICIAN ASSISTANT

## 2022-02-04 PROCEDURE — U0003 INFECTIOUS AGENT DETECTION BY NUCLEIC ACID (DNA OR RNA); SEVERE ACUTE RESPIRATORY SYNDROME CORONAVIRUS 2 (SARS-COV-2) (CORONAVIRUS DISEASE [COVID-19]), AMPLIFIED PROBE TECHNIQUE, MAKING USE OF HIGH THROUGHPUT TECHNOLOGIES AS DESCRIBED BY CMS-2020-01-R: HCPCS

## 2022-02-04 RX ORDER — DEXAMETHASONE SODIUM PHOSPHATE 4 MG/ML
8 INJECTION, SOLUTION INTRA-ARTICULAR; INTRALESIONAL; INTRAMUSCULAR; INTRAVENOUS; SOFT TISSUE ONCE
Status: COMPLETED | OUTPATIENT
Start: 2022-02-04 | End: 2022-02-04

## 2022-02-04 RX ADMIN — DEXAMETHASONE SODIUM PHOSPHATE 8 MG: 4 INJECTION, SOLUTION INTRA-ARTICULAR; INTRALESIONAL; INTRAMUSCULAR; INTRAVENOUS; SOFT TISSUE at 17:35

## 2022-02-04 ASSESSMENT — ENCOUNTER SYMPTOMS
SORE THROAT: 1
COUGH: 1

## 2022-02-05 NOTE — PROGRESS NOTES
"  Subjective:   Gadiel Edgar is a 41 y.o. male who presents today with   Chief Complaint   Patient presents with   • Sore Throat     x1week, pt thinks it may be allergies from new job    • Runny Nose     nasal drip in the back of throat      Pharyngitis   This is a new problem. The current episode started in the past 7 days. The problem has been unchanged. There has been no fever. The pain is mild. Associated symptoms include congestion and coughing. Associated symptoms comments: Sore throat. He has tried nothing for the symptoms.   I personally donned proper PPE throughout visit today.   Patient states that he believes he could have some sore throat due to environmental allergies as he has had this happen before in the past.  No known exposure to Covid.  PMH:  has no past medical history on file.  MEDS:   Current Outpatient Medications:   •  Naproxen Sodium (ALEVE) 220 MG Cap, Take 2 Caps by mouth as needed., Disp: , Rfl:   •  fluticasone (FLONASE) 50 MCG/ACT nasal spray, Spray 1 Spray in nose every day., Disp: 16 g, Rfl: 0    Current Facility-Administered Medications:   •  dexamethasone (DECADRON) injection 8 mg, 8 mg, Oral, Once, Nicolás Morales P.A.-C.  ALLERGIES: No Known Allergies  SURGHX: History reviewed. No pertinent surgical history.  SOCHX:  reports that he has been smoking cigars and cigarettes. He has been smoking about 0.25 packs per day. He has quit using smokeless tobacco.  His smokeless tobacco use included chew. He reports current alcohol use. He reports current drug use. Drug: Marijuana.  FH: Reviewed with patient, not pertinent to this visit.     Review of Systems   HENT: Positive for congestion and sore throat.         Runny nose   Respiratory: Positive for cough.       Objective:   /90   Pulse 64   Temp 36.5 °C (97.7 °F) (Temporal)   Resp 16   Ht 1.753 m (5' 9\")   Wt 79.4 kg (175 lb)   SpO2 100%   BMI 25.84 kg/m²   Physical Exam  Vitals and nursing note reviewed. "   Constitutional:       General: He is not in acute distress.     Appearance: Normal appearance. He is well-developed. He is not ill-appearing or toxic-appearing.   HENT:      Head: Normocephalic and atraumatic.      Right Ear: Hearing normal.      Left Ear: Hearing normal.      Nose: Rhinorrhea present.      Mouth/Throat:      Pharynx: Posterior oropharyngeal erythema present. No oropharyngeal exudate.      Comments: postnasal drip  Eyes:      Conjunctiva/sclera: Conjunctivae normal.   Cardiovascular:      Rate and Rhythm: Normal rate and regular rhythm.      Heart sounds: Normal heart sounds.   Pulmonary:      Effort: Pulmonary effort is normal.      Breath sounds: No stridor. No wheezing, rhonchi or rales.   Musculoskeletal:      Comments: Normal movement in all 4 extremities   Lymphadenopathy:      Cervical: No cervical adenopathy.   Skin:     General: Skin is warm and dry.   Neurological:      Mental Status: He is alert.      Coordination: Coordination normal.   Psychiatric:         Mood and Affect: Mood normal.      STREP A Negative  Assessment/Plan:   Assessment    1. Sore throat  - POCT Rapid Strep A  - SARS-CoV-2 PCR (24 hour In-House): Collect NP swab in VTM; Future  - dexamethasone (DECADRON) injection 8 mg    2. Cough  - SARS-CoV-2 PCR (24 hour In-House): Collect NP swab in VTM; Future    3. Allergic rhinitis, unspecified seasonality, unspecified trigger  - Referral to Allergy  Symptoms and presentation most consistent with viral illnesses versus allergic rhinitis causing some postnasal drainage that is in turn resulting in a sore throat.  We will also rule out Covid today although patient did have negative at home test earlier today.  Recommend use of Flonase and Claritin over-the-counter.  Patient tolerated Decadron in clinic today.  Patient would like to follow-up with an allergist as well.  Differential diagnosis, natural history, supportive care, and indications for immediate follow-up discussed.    Patient given instructions and understanding of medications and treatment.    If not improving in 3-5 days, F/U with PCP or return to UC if symptoms worsen.    Patient agreeable to plan.      Please note that this dictation was created using voice recognition software. I have made every reasonable attempt to correct obvious errors, but I expect that there are errors of grammar and possibly content that I did not discover before finalizing the note.    Nicolás Morales PA-C

## 2022-02-19 ENCOUNTER — HOSPITAL ENCOUNTER (OUTPATIENT)
Dept: LAB | Facility: MEDICAL CENTER | Age: 42
End: 2022-02-19
Attending: FAMILY MEDICINE
Payer: COMMERCIAL

## 2022-02-19 DIAGNOSIS — Z00.00 ANNUAL PHYSICAL EXAM: ICD-10-CM

## 2022-02-19 DIAGNOSIS — Z13.6 SCREENING FOR CARDIOVASCULAR CONDITION: ICD-10-CM

## 2022-02-19 LAB
ALBUMIN SERPL BCP-MCNC: 4.3 G/DL (ref 3.2–4.9)
ALBUMIN/GLOB SERPL: 1.9 G/DL
ALP SERPL-CCNC: 57 U/L (ref 30–99)
ALT SERPL-CCNC: 24 U/L (ref 2–50)
ANION GAP SERPL CALC-SCNC: 8 MMOL/L (ref 7–16)
AST SERPL-CCNC: 18 U/L (ref 12–45)
BILIRUB SERPL-MCNC: 0.2 MG/DL (ref 0.1–1.5)
BUN SERPL-MCNC: 17 MG/DL (ref 8–22)
CALCIUM SERPL-MCNC: 8.8 MG/DL (ref 8.5–10.5)
CHLORIDE SERPL-SCNC: 106 MMOL/L (ref 96–112)
CHOLEST SERPL-MCNC: 231 MG/DL (ref 100–199)
CO2 SERPL-SCNC: 25 MMOL/L (ref 20–33)
CREAT SERPL-MCNC: 0.94 MG/DL (ref 0.5–1.4)
ERYTHROCYTE [DISTWIDTH] IN BLOOD BY AUTOMATED COUNT: 43.4 FL (ref 35.9–50)
GLOBULIN SER CALC-MCNC: 2.3 G/DL (ref 1.9–3.5)
GLUCOSE SERPL-MCNC: 102 MG/DL (ref 65–99)
HCT VFR BLD AUTO: 47.8 % (ref 42–52)
HDLC SERPL-MCNC: 46 MG/DL
HGB BLD-MCNC: 15.9 G/DL (ref 14–18)
LDLC SERPL CALC-MCNC: 164 MG/DL
MCH RBC QN AUTO: 29.8 PG (ref 27–33)
MCHC RBC AUTO-ENTMCNC: 33.3 G/DL (ref 33.7–35.3)
MCV RBC AUTO: 89.7 FL (ref 81.4–97.8)
PLATELET # BLD AUTO: 340 K/UL (ref 164–446)
PMV BLD AUTO: 9.5 FL (ref 9–12.9)
POTASSIUM SERPL-SCNC: 4.6 MMOL/L (ref 3.6–5.5)
PROT SERPL-MCNC: 6.6 G/DL (ref 6–8.2)
RBC # BLD AUTO: 5.33 M/UL (ref 4.7–6.1)
SODIUM SERPL-SCNC: 139 MMOL/L (ref 135–145)
TRIGL SERPL-MCNC: 106 MG/DL (ref 0–149)
WBC # BLD AUTO: 6.4 K/UL (ref 4.8–10.8)

## 2022-02-19 PROCEDURE — 80061 LIPID PANEL: CPT

## 2022-02-19 PROCEDURE — 85027 COMPLETE CBC AUTOMATED: CPT

## 2022-02-19 PROCEDURE — 36415 COLL VENOUS BLD VENIPUNCTURE: CPT

## 2022-02-19 PROCEDURE — 80053 COMPREHEN METABOLIC PANEL: CPT

## 2022-03-26 ENCOUNTER — OFFICE VISIT (OUTPATIENT)
Dept: URGENT CARE | Facility: CLINIC | Age: 42
End: 2022-03-26
Payer: COMMERCIAL

## 2022-03-26 VITALS
WEIGHT: 180 LBS | SYSTOLIC BLOOD PRESSURE: 116 MMHG | HEIGHT: 69 IN | OXYGEN SATURATION: 96 % | TEMPERATURE: 97.7 F | RESPIRATION RATE: 16 BRPM | DIASTOLIC BLOOD PRESSURE: 70 MMHG | BODY MASS INDEX: 26.66 KG/M2 | HEART RATE: 68 BPM

## 2022-03-26 DIAGNOSIS — R59.1 LYMPHADENOPATHY: ICD-10-CM

## 2022-03-26 DIAGNOSIS — R22.1 NECK SWELLING: ICD-10-CM

## 2022-03-26 DIAGNOSIS — H92.01 OTALGIA, RIGHT: ICD-10-CM

## 2022-03-26 LAB
INT CON NEG: NORMAL
INT CON POS: NORMAL
S PYO AG THROAT QL: NEGATIVE

## 2022-03-26 PROCEDURE — 87880 STREP A ASSAY W/OPTIC: CPT | Performed by: NURSE PRACTITIONER

## 2022-03-26 PROCEDURE — 99213 OFFICE O/P EST LOW 20 MIN: CPT | Performed by: NURSE PRACTITIONER

## 2022-03-26 ASSESSMENT — FIBROSIS 4 INDEX: FIB4 SCORE: 0.45

## 2022-03-26 ASSESSMENT — ENCOUNTER SYMPTOMS
RESPIRATORY NEGATIVE: 1
CONSTITUTIONAL NEGATIVE: 1
SHORTNESS OF BREATH: 0
SORE THROAT: 0
NECK PAIN: 1
FEVER: 0
CHILLS: 0
COUGH: 0

## 2022-03-26 ASSESSMENT — VISUAL ACUITY: OU: 1

## 2022-03-26 NOTE — LETTER
March 26, 2022         Patient: Gadiel Edgar   YOB: 1980   Date of Visit: 3/26/2022           To Whom it May Concern:    Gadiel Edgar was seen in my clinic on 3/26/2022 due to illness. Due to medical necessity, please excuse patient from work for up to the next 3 days as needed.    If you have any questions or concerns, please don't hesitate to call.        Sincerely,         PORTIA Costello.  Electronically Signed

## 2022-03-27 ENCOUNTER — HOSPITAL ENCOUNTER (OUTPATIENT)
Dept: RADIOLOGY | Facility: MEDICAL CENTER | Age: 42
End: 2022-03-27
Attending: NURSE PRACTITIONER
Payer: COMMERCIAL

## 2022-03-27 DIAGNOSIS — R22.1 NECK SWELLING: ICD-10-CM

## 2022-03-27 DIAGNOSIS — R59.1 LYMPHADENOPATHY: ICD-10-CM

## 2022-03-27 PROCEDURE — 76536 US EXAM OF HEAD AND NECK: CPT

## 2022-03-27 NOTE — PATIENT INSTRUCTIONS
Lymphadenopathy    Lymphadenopathy means that your lymph glands are swollen or larger than normal (enlarged). Lymph glands, also called lymph nodes, are collections of tissue that filter bacteria, viruses, and waste from your bloodstream. They are part of your body's disease-fighting system (immune system), which protects your body from germs.  There may be different causes of lymphadenopathy, depending on where it is in your body. Some types go away on their own. Lymphadenopathy can occur anywhere that you have lymph glands, including these areas:  · Neck (cervical lymphadenopathy).  · Chest (mediastinal lymphadenopathy).  · Lungs (hilar lymphadenopathy).  · Underarms (axillary lymphadenopathy).  · Groin (inguinal lymphadenopathy).  When your immune system responds to germs, infection-fighting cells and fluid build up in your lymph glands. This causes some swelling and enlargement. If the lymph glands do not go back to normal after you have an infection or disease, your health care provider may do tests. These tests help to monitor your condition and find the reason why the glands are still swollen and enlarged.  Follow these instructions at home:  · Get plenty of rest.  · Take over-the-counter and prescription medicines only as told by your health care provider. Your health care provider may recommend over-the-counter medicines for pain.  · If directed, apply heat to swollen lymph glands as often as told by your health care provider. Use the heat source that your health care provider recommends, such as a moist heat pack or a heating pad.  ? Place a towel between your skin and the heat source.  ? Leave the heat on for 20-30 minutes.  ? Remove the heat if your skin turns bright red. This is especially important if you are unable to feel pain, heat, or cold. You may have a greater risk of getting burned.  · Check your affected lymph glands every day for changes. Check other lymph gland areas as told by your health  care provider. Check for changes such as:  ? More swelling.  ? Sudden increase in size.  ? Redness or pain.  ? Hardness.  · Keep all follow-up visits as told by your health care provider. This is important.  Contact a health care provider if you have:  · Swelling that gets worse or spreads to other areas.  · Problems with breathing.  · Lymph glands that:  ? Are still swollen after 2 weeks.  ? Have suddenly gotten bigger.  ? Are red, painful, or hard.  · A fever or chills.  · Fatigue.  · A sore throat.  · Pain in your abdomen.  · Weight loss.  · Night sweats.  Get help right away if you have:  · Fluid leaking from an enlarged lymph gland.  · Severe pain.  · Chest pain.  · Shortness of breath.  Summary  · Lymphadenopathy means that your lymph glands are swollen or larger than normal (enlarged).  · Lymph glands (also called lymph nodes) are collections of tissue that filter bacteria, viruses, and waste from the bloodstream. They are part of your body's disease-fighting system (immune system).  · Lymphadenopathy can occur anywhere that you have lymph glands.  · If your enlarged and swollen lymph glands do not go back to normal after you have an infection or disease, your health care provider may do tests to monitor your condition and find the reason why the glands are still swollen and enlarged.  · Check your affected lymph glands every day for changes. Check other lymph gland areas as told by your health care provider.  This information is not intended to replace advice given to you by your health care provider. Make sure you discuss any questions you have with your health care provider.  Document Released: 09/26/2009 Document Revised: 11/30/2018 Document Reviewed: 11/02/2018  Elsevier Patient Education © 2020 [employer]          Viral Respiratory Infection  A respiratory infection is an illness that affects part of the respiratory system, such as the lungs, nose, or throat. A respiratory infection that is caused by  a virus is called a viral respiratory infection.  Common types of viral respiratory infections include:  · A cold.  · The flu (influenza).  · A respiratory syncytial virus (RSV) infection.  What are the causes?  This condition is caused by a virus.  What are the signs or symptoms?  Symptoms of this condition include:  · A stuffy or runny nose.  · Yellow or green nasal discharge.  · A cough.  · Sneezing.  · Fatigue.  · Achy muscles.  · A sore throat.  · Sweating or chills.  · A fever.  · A headache.  How is this diagnosed?  This condition may be diagnosed based on:  · Your symptoms.  · A physical exam.  · Testing of nasal swabs.  How is this treated?  This condition may be treated with medicines, such as:  · Antiviral medicine. This may shorten the length of time a person has symptoms.  · Expectorants. These make it easier to cough up mucus.  · Decongestant nasal sprays.  · Acetaminophen or NSAIDs to relieve fever and pain.  Antibiotic medicines are not prescribed for viral infections. This is because antibiotics are designed to kill bacteria. They are not effective against viruses.  Follow these instructions at home:    Managing pain and congestion  · Take over-the-counter and prescription medicines only as told by your health care provider.  · If you have a sore throat, gargle with a salt-water mixture 3-4 times a day or as needed. To make a salt-water mixture, completely dissolve ½-1 tsp of salt in 1 cup of warm water.  · Use nose drops made from salt water to ease congestion and soften raw skin around your nose.  · Drink enough fluid to keep your urine pale yellow. This helps prevent dehydration and helps loosen up mucus.  General instructions  · Rest as much as possible.  · Do not drink alcohol.  · Do not use any products that contain nicotine or tobacco, such as cigarettes and e-cigarettes. If you need help quitting, ask your health care provider.  · Keep all follow-up visits as told by your health care  provider. This is important.  How is this prevented?    · Get an annual flu shot. You may get the flu shot in late summer, fall, or winter. Ask your health care provider when you should get your flu shot.  · Avoid exposing others to your respiratory infection.  ? Stay home from work or school as told by your health care provider.  ? Wash your hands with soap and water often, especially after you cough or sneeze. If soap and water are not available, use alcohol-based hand .  · Avoid contact with people who are sick during cold and flu season. This is generally fall and winter.  Contact a health care provider if:  · Your symptoms last for 10 days or longer.  · Your symptoms get worse over time.  · You have a fever.  · You have severe sinus pain in your face or forehead.  · The glands in your jaw or neck become very swollen.  Get help right away if you:  · Feel pain or pressure in your chest.  · Have shortness of breath.  · Faint or feel like you will faint.  · Have severe and persistent vomiting.  · Feel confused or disoriented.  Summary  · A respiratory infection is an illness that affects part of the respiratory system, such as the lungs, nose, or throat. A respiratory infection that is caused by a virus is called a viral respiratory infection.  · Common types of viral respiratory infections are a cold, influenza, and respiratory syncytial virus (RSV) infection.  · Symptoms of this condition include a stuffy or runny nose, cough, sneezing, fatigue, achy muscles, sore throat, and fevers or chills.  · Antibiotic medicines are not prescribed for viral infections. This is because antibiotics are designed to kill bacteria. They are not effective against viruses.  This information is not intended to replace advice given to you by your health care provider. Make sure you discuss any questions you have with your health care provider.  Document Released: 09/27/2006 Document Revised: 12/26/2019 Document Reviewed:  01/28/2019  Elsevier Patient Education © 2020 [employer]

## 2022-03-27 NOTE — PROGRESS NOTES
Subjective:     Gadiel Edgar is a 41 y.o. male who presents for Neck Pain (X 2 days, swelling Lt neck.)       Neck Pain   This is a new problem. The problem has been gradually worsening. Pertinent negatives include no fever.     Patient reports on Thursday, he started to notice right-sided neck swelling and tenderness as well as pain.  Denies sore throat.  Reports right ear pain.  Reports a burning sensation.  Denies fever, chills, nasal congestion, cough, shortness of breath, or other symptoms.  Concerned about the swelling in his neck as he is a smoker.    Patient was screened prior to rooming and denied COVID-19 diagnosis or contact with a person who has been diagnosed or is suspected to have COVID-19. During this visit, appropriate PPE was worn, hand hygiene was performed, and the patient and any visitors were masked.     PMH:  has no past medical history on file.    MEDS:   Current Outpatient Medications:   •  fluticasone (FLONASE) 50 MCG/ACT nasal spray, Spray 1 Spray in nose every day., Disp: 16 g, Rfl: 0  •  Naproxen Sodium 220 MG Cap, Take 2 Caps by mouth as needed. (Patient not taking: Reported on 3/26/2022), Disp: , Rfl:     ALLERGIES: No Known Allergies    SURGHX: History reviewed. No pertinent surgical history.    SOCHX:  reports that he has been smoking cigars and cigarettes. He has been smoking about 0.25 packs per day. He has quit using smokeless tobacco.  His smokeless tobacco use included chew. He reports current alcohol use. He reports current drug use. Drug: Marijuana.     FH: Reviewed with patient, not pertinent to this visit.    Review of Systems   Constitutional: Negative.  Negative for chills, fever and malaise/fatigue.   HENT: Positive for ear pain. Negative for congestion and sore throat.    Respiratory: Negative.  Negative for cough and shortness of breath.    Musculoskeletal: Positive for neck pain.   All other systems reviewed and are negative.    Additional details per  "HPI.      Objective:     /70   Pulse 68   Temp 36.5 °C (97.7 °F) (Temporal)   Resp 16   Ht 1.753 m (5' 9\")   Wt 81.6 kg (180 lb)   SpO2 96%   BMI 26.58 kg/m²     Physical Exam  Vitals reviewed.   Constitutional:       General: He is not in acute distress.     Appearance: He is well-developed. He is not ill-appearing or toxic-appearing.   HENT:      Right Ear: Tympanic membrane normal.      Left Ear: Tympanic membrane normal.      Mouth/Throat:      Mouth: Mucous membranes are moist.      Pharynx: Uvula midline. Pharyngeal swelling and posterior oropharyngeal erythema present. No oropharyngeal exudate.   Eyes:      General: Vision grossly intact.   Cardiovascular:      Rate and Rhythm: Normal rate.   Pulmonary:      Effort: Pulmonary effort is normal. No respiratory distress.   Musculoskeletal:         General: No deformity. Normal range of motion.      Cervical back: Normal range of motion. Normal range of motion.   Lymphadenopathy:      Cervical: Cervical adenopathy (Mild right, TTP) present.   Skin:     General: Skin is warm and dry.      Coloration: Skin is not pale.   Neurological:      Mental Status: He is alert and oriented to person, place, and time.      Sensory: No sensory deficit.      Motor: No weakness.   Psychiatric:         Behavior: Behavior normal. Behavior is cooperative.     Rapid Strep A swab: negative      Assessment/Plan:     1. Otalgia, right    2. Neck swelling  - POCT Rapid Strep A  - US-SOFT TISSUES OF HEAD - NECK; Future    3. Lymphadenopathy  - US-SOFT TISSUES OF HEAD - NECK; Future    Discussed likely self-limiting viral etiology and expected course and duration of illness. Vital signs stable, afebrile, no acute distress at this time.     Patient amenable to US. US pending to further evaluate.    Differential diagnosis, natural history, supportive care, rest, fluids, warm compresses, over-the-counter symptom management per 's instructions, ibuprofe, close " monitoring, and indications for immediate follow-up discussed.     Warning signs reviewed. Return precautions discussed.     All questions answered. Patient agrees with the plan of care.    Discharge summary provided.    Work note provided.

## 2023-01-16 ENCOUNTER — HOSPITAL ENCOUNTER (OUTPATIENT)
Dept: LAB | Facility: MEDICAL CENTER | Age: 43
End: 2023-01-16
Attending: FAMILY MEDICINE
Payer: COMMERCIAL

## 2023-01-16 ENCOUNTER — OFFICE VISIT (OUTPATIENT)
Dept: MEDICAL GROUP | Facility: LAB | Age: 43
End: 2023-01-16
Payer: COMMERCIAL

## 2023-01-16 VITALS
OXYGEN SATURATION: 96 % | SYSTOLIC BLOOD PRESSURE: 120 MMHG | HEIGHT: 69 IN | RESPIRATION RATE: 15 BRPM | BODY MASS INDEX: 25.48 KG/M2 | HEART RATE: 100 BPM | TEMPERATURE: 98 F | WEIGHT: 172 LBS | DIASTOLIC BLOOD PRESSURE: 76 MMHG

## 2023-01-16 DIAGNOSIS — N52.9 ERECTILE DYSFUNCTION, UNSPECIFIED ERECTILE DYSFUNCTION TYPE: ICD-10-CM

## 2023-01-16 DIAGNOSIS — Z23 NEED FOR VACCINATION: ICD-10-CM

## 2023-01-16 DIAGNOSIS — Z12.5 ENCOUNTER FOR SCREENING FOR MALIGNANT NEOPLASM OF PROSTATE: ICD-10-CM

## 2023-01-16 LAB
ALBUMIN SERPL BCP-MCNC: 4.3 G/DL (ref 3.2–4.9)
ALBUMIN/GLOB SERPL: 1.9 G/DL
ALP SERPL-CCNC: 62 U/L (ref 30–99)
ALT SERPL-CCNC: 17 U/L (ref 2–50)
ANION GAP SERPL CALC-SCNC: 9 MMOL/L (ref 7–16)
AST SERPL-CCNC: 16 U/L (ref 12–45)
BASOPHILS # BLD AUTO: 0.5 % (ref 0–1.8)
BASOPHILS # BLD: 0.03 K/UL (ref 0–0.12)
BILIRUB SERPL-MCNC: 0.2 MG/DL (ref 0.1–1.5)
BUN SERPL-MCNC: 17 MG/DL (ref 8–22)
CALCIUM ALBUM COR SERPL-MCNC: 8.7 MG/DL (ref 8.5–10.5)
CALCIUM SERPL-MCNC: 8.9 MG/DL (ref 8.5–10.5)
CHLORIDE SERPL-SCNC: 111 MMOL/L (ref 96–112)
CO2 SERPL-SCNC: 26 MMOL/L (ref 20–33)
CREAT SERPL-MCNC: 1.09 MG/DL (ref 0.5–1.4)
EOSINOPHIL # BLD AUTO: 0.26 K/UL (ref 0–0.51)
EOSINOPHIL NFR BLD: 4 % (ref 0–6.9)
ERYTHROCYTE [DISTWIDTH] IN BLOOD BY AUTOMATED COUNT: 43.5 FL (ref 35.9–50)
GFR SERPLBLD CREATININE-BSD FMLA CKD-EPI: 86 ML/MIN/1.73 M 2
GLOBULIN SER CALC-MCNC: 2.3 G/DL (ref 1.9–3.5)
GLUCOSE SERPL-MCNC: 66 MG/DL (ref 65–99)
HCT VFR BLD AUTO: 48.6 % (ref 42–52)
HGB BLD-MCNC: 16.4 G/DL (ref 14–18)
IMM GRANULOCYTES # BLD AUTO: 0.03 K/UL (ref 0–0.11)
IMM GRANULOCYTES NFR BLD AUTO: 0.5 % (ref 0–0.9)
LYMPHOCYTES # BLD AUTO: 2.29 K/UL (ref 1–4.8)
LYMPHOCYTES NFR BLD: 35.4 % (ref 22–41)
MCH RBC QN AUTO: 30 PG (ref 27–33)
MCHC RBC AUTO-ENTMCNC: 33.7 G/DL (ref 33.7–35.3)
MCV RBC AUTO: 89 FL (ref 81.4–97.8)
MONOCYTES # BLD AUTO: 0.37 K/UL (ref 0–0.85)
MONOCYTES NFR BLD AUTO: 5.7 % (ref 0–13.4)
NEUTROPHILS # BLD AUTO: 3.49 K/UL (ref 1.82–7.42)
NEUTROPHILS NFR BLD: 53.9 % (ref 44–72)
NRBC # BLD AUTO: 0 K/UL
NRBC BLD-RTO: 0 /100 WBC
PLATELET # BLD AUTO: 379 K/UL (ref 164–446)
PMV BLD AUTO: 9.9 FL (ref 9–12.9)
POTASSIUM SERPL-SCNC: 4.6 MMOL/L (ref 3.6–5.5)
PROT SERPL-MCNC: 6.6 G/DL (ref 6–8.2)
PSA SERPL-MCNC: 1.62 NG/ML (ref 0–4)
RBC # BLD AUTO: 5.46 M/UL (ref 4.7–6.1)
SODIUM SERPL-SCNC: 146 MMOL/L (ref 135–145)
TSH SERPL DL<=0.005 MIU/L-ACNC: 2.21 UIU/ML (ref 0.38–5.33)
WBC # BLD AUTO: 6.5 K/UL (ref 4.8–10.8)

## 2023-01-16 PROCEDURE — 84270 ASSAY OF SEX HORMONE GLOBUL: CPT

## 2023-01-16 PROCEDURE — 84443 ASSAY THYROID STIM HORMONE: CPT

## 2023-01-16 PROCEDURE — 80053 COMPREHEN METABOLIC PANEL: CPT

## 2023-01-16 PROCEDURE — 84403 ASSAY OF TOTAL TESTOSTERONE: CPT

## 2023-01-16 PROCEDURE — 84153 ASSAY OF PSA TOTAL: CPT

## 2023-01-16 PROCEDURE — 99214 OFFICE O/P EST MOD 30 MIN: CPT | Mod: 25 | Performed by: FAMILY MEDICINE

## 2023-01-16 PROCEDURE — 90472 IMMUNIZATION ADMIN EACH ADD: CPT | Performed by: FAMILY MEDICINE

## 2023-01-16 PROCEDURE — 90677 PCV20 VACCINE IM: CPT | Performed by: FAMILY MEDICINE

## 2023-01-16 PROCEDURE — 84402 ASSAY OF FREE TESTOSTERONE: CPT

## 2023-01-16 PROCEDURE — 85025 COMPLETE CBC W/AUTO DIFF WBC: CPT

## 2023-01-16 PROCEDURE — 90471 IMMUNIZATION ADMIN: CPT | Performed by: FAMILY MEDICINE

## 2023-01-16 PROCEDURE — 90686 IIV4 VACC NO PRSV 0.5 ML IM: CPT | Performed by: FAMILY MEDICINE

## 2023-01-16 PROCEDURE — 36415 COLL VENOUS BLD VENIPUNCTURE: CPT

## 2023-01-16 RX ORDER — SILDENAFIL 50 MG/1
50 TABLET, FILM COATED ORAL
Qty: 30 TABLET | Refills: 3 | Status: SHIPPED | OUTPATIENT
Start: 2023-01-16 | End: 2023-01-23

## 2023-01-16 ASSESSMENT — PATIENT HEALTH QUESTIONNAIRE - PHQ9: CLINICAL INTERPRETATION OF PHQ2 SCORE: 0

## 2023-01-16 ASSESSMENT — FIBROSIS 4 INDEX: FIB4 SCORE: 0.46

## 2023-01-16 NOTE — PROGRESS NOTES
"Subjective:   Gadiel Edgar is a 42 y.o. male here today for   Chief Complaint   Patient presents with    Penis Pain       #Erectile dysfunction  -Ongoing for the last 1 year   -Having difficulty maintaining erection. No problems with achieving.  He states that his libido is not tact, no concerns regarding depression.  He denies any palliative provocative factors.  This is becoming very frustrating for patient.  Has tried over-the-counter supplements with no real improvement.  -Never been a problem for her.  Denies any excessive alcohol use, drug use.  Is a daily smoker.  Denies any dysuria, hematuria, urethral discharge.  Denies any trauma, denies any new medical history, hospitalization, or surgery.      No Known Allergies      Current medicines (including changes today)  Current Outpatient Medications   Medication Sig Dispense Refill    Naproxen Sodium 220 MG Cap Take 2 Caps by mouth as needed. (Patient not taking: Reported on 3/26/2022)      fluticasone (FLONASE) 50 MCG/ACT nasal spray Spray 1 Spray in nose every day. 16 g 0     No current facility-administered medications for this visit.     He  has no past medical history on file.    ROS   -See HPI       Objective:     Physical Exam:  /76   Pulse 100   Temp 36.7 °C (98 °F) (Temporal)   Resp 15   Ht 1.753 m (5' 9.02\")   Wt 78 kg (172 lb)   SpO2 96%  Body mass index is 25.39 kg/m².   Constitutional: Alert, no distress.  Skin: Warm, dry, good turgor, no rashes in visible areas.  Eye: Equal, round and reactive, conjunctiva clear, lids normal.  Respiratory: Unlabored respiratory effort, lungs clear to auscultation, no wheezes, no rhonchi.  Cardiovascular: Normal S1, S2, no murmur, no edema.  Abdomen: Soft, non-tender, no masses, no hepatosplenomegaly.  Psych: Alert and oriented x3, normal affect and mood.    Assessment and Plan:     1. Erectile dysfunction, unspecified erectile dysfunction type  -New problem.  Uncertain prognosis and etiology.  " He does have a history of smoking and thus erectile dysfunction could be due to vascular causes.  Patient has been getting extremely stressed out and worried about erectile dysfunction thus making psychogenic factor is a possibility.  We will attempt further treatment this time with Viagra.  Discussed importance of medication discussed possible side effects.  We will check labs below to rule out any other secondary cause.  We will follow-up with patient with results and symptom check in 1 month.  Patient understood agree with current treatment plan.  - PROSTATE SPECIFIC AG SCREENING; Future  - CBC WITH DIFFERENTIAL; Future  - Comp Metabolic Panel; Future  - Testosterone, Free & Total, Adult Male (w/SHBG); Future  - TSH WITH REFLEX TO FT4; Future  - sildenafil citrate (VIAGRA) 50 MG tablet; Take 1 Tablet by mouth 1 time a day as needed for Erectile Dysfunction.  Dispense: 30 Tablet; Refill: 3    2. Encounter for screening for malignant neoplasm of prostate  - PROSTATE SPECIFIC AG SCREENING; Future    3. Need for vaccination  -Patient was agreeable to receiving the influenza, pneumococcal vaccine in clinic today after discussion of potential risks, benefits, and side effects. Vaccine was administered without adverse effects.  - INFLUENZA VACCINE QUAD INJ (PF)  - Pneumococcal Conjugate Vaccine 20-Valent (19 yrs+)    Followup: No follow-ups on file.         PLEASE NOTE: This dictation was created using voice recognition software. I have made every reasonable attempt to correct obvious errors, but I expect that there are errors of grammar and possibly content that I did not discover before finalizing the note.

## 2023-01-18 LAB
SHBG SERPL-SCNC: 34 NMOL/L (ref 17–56)
TESTOST FREE MFR SERPL: 1.9 % (ref 1.6–2.9)
TESTOST FREE SERPL-MCNC: 102 PG/ML (ref 47–244)
TESTOST SERPL-MCNC: 541 NG/DL (ref 300–890)

## 2023-01-22 DIAGNOSIS — N52.9 ERECTILE DYSFUNCTION, UNSPECIFIED ERECTILE DYSFUNCTION TYPE: ICD-10-CM

## 2023-01-23 ENCOUNTER — PATIENT MESSAGE (OUTPATIENT)
Dept: MEDICAL GROUP | Facility: LAB | Age: 43
End: 2023-01-23
Payer: COMMERCIAL

## 2023-01-23 DIAGNOSIS — N52.9 ERECTILE DYSFUNCTION, UNSPECIFIED ERECTILE DYSFUNCTION TYPE: ICD-10-CM

## 2023-01-23 RX ORDER — SILDENAFIL 50 MG/1
50 TABLET, FILM COATED ORAL
Qty: 30 TABLET | Refills: 3 | Status: SHIPPED | OUTPATIENT
Start: 2023-01-23 | End: 2023-01-24 | Stop reason: SDUPTHER

## 2023-01-23 NOTE — TELEPHONE ENCOUNTER
Please let patient know that there are no other low cost alternatives. He can use [facility] to find a lower cost.

## 2023-01-24 NOTE — PATIENT COMMUNICATION
Received request via: Patient    Was the patient seen in the last year in this department? Yes    Does the patient have an active prescription (recently filled or refills available) for medication(s) requested? No    Does the patient have care home Plus and need 100 day supply (blood pressure, diabetes and cholesterol meds only)? Patient does not have SCP          
DISPLAY PLAN FREE TEXT

## 2023-01-25 RX ORDER — SILDENAFIL 50 MG/1
50 TABLET, FILM COATED ORAL
Qty: 90 TABLET | Refills: 0 | Status: SHIPPED | OUTPATIENT
Start: 2023-01-25 | End: 2023-05-18 | Stop reason: SDUPTHER

## 2023-05-18 ENCOUNTER — TELEMEDICINE (OUTPATIENT)
Dept: MEDICAL GROUP | Facility: LAB | Age: 43
End: 2023-05-18
Payer: COMMERCIAL

## 2023-05-18 ENCOUNTER — TELEPHONE (OUTPATIENT)
Dept: MEDICAL GROUP | Facility: LAB | Age: 43
End: 2023-05-18

## 2023-05-18 VITALS — BODY MASS INDEX: 25.4 KG/M2 | HEIGHT: 69 IN

## 2023-05-18 DIAGNOSIS — F32.A DEPRESSION, UNSPECIFIED DEPRESSION TYPE: ICD-10-CM

## 2023-05-18 DIAGNOSIS — N52.9 ERECTILE DYSFUNCTION, UNSPECIFIED ERECTILE DYSFUNCTION TYPE: ICD-10-CM

## 2023-05-18 PROCEDURE — 99213 OFFICE O/P EST LOW 20 MIN: CPT | Mod: 95 | Performed by: FAMILY MEDICINE

## 2023-05-18 RX ORDER — SILDENAFIL 50 MG/1
50 TABLET, FILM COATED ORAL
Qty: 90 TABLET | Refills: 0 | Status: SHIPPED | OUTPATIENT
Start: 2023-05-18

## 2023-05-18 NOTE — PROGRESS NOTES
Virtual Visit: Established Patient   This visit was conducted via Zoom using secure and encrypted videoconferencing technology.   The patient was in their home in the [facility].    The patient's identity was confirmed and verbal consent was obtained for this virtual visit.     Subjective:   CC:   Chief Complaint   Patient presents with    Other     Doctors note. Refill prescription.     Gadiel Edgar is a 42 y.o. male presenting for evaluation and management of:    #Depression:  -Patient presents today to discuss symptoms of depression as it is related to his work schedule.  Patient is working 7 days a week.  He states that it is become extremely difficult to manage and is started having symptoms of depression including low motivation, anhedonia.  He states he also is having difficulty relaxing at the end of the day and spending time with family because of this.    -States he has a previous diagnosis of depression and was previously treated with antidepressants but however, discontinued medication due to significant side effects.  This was a remote diagnosis.  He has never been seen or treated with counseling before.  -Patient feels like a lot of these recurring symptoms of depression are due to a significant change in his work/life balance as he is working approximately 7 days a week.  He is requesting a note at this time that he can turn into work so that he is only working 6 days a week, giving him time off to be at home, spend time with family, and hopefully avoid any significant worsening of depressive symptoms.  -Currently denies any insomnia, suicidal/homicidal ideations, loose Nations, delusions, paranoia.    #Dysfunction:  -Chronic longstanding condition currently treated with Viagra.  He is still finding benefit medication.  Denies any significant side effects.  Wishes to continue with medication at the time and is requesting refill.  Nuys any chest pain, shortness of breath, congestion  "changes in vision when using medication.    ROS   -See HPI    Current medicines (including changes today)  Current Outpatient Medications   Medication Sig Dispense Refill    sildenafil citrate (VIAGRA) 50 MG tablet Take 1 Tablet by mouth 1 time a day as needed for Erectile Dysfunction. 90 Tablet 0    Naproxen Sodium 220 MG Cap Take 2 Caps by mouth as needed. (Patient not taking: Reported on 3/26/2022)      fluticasone (FLONASE) 50 MCG/ACT nasal spray Spray 1 Spray in nose every day. 16 g 0     No current facility-administered medications for this visit.       Patient Active Problem List    Diagnosis Date Noted    Dyslipidemia 02/21/2020    Tobacco abuse 02/21/2020    Ocular migraine 02/21/2020        Objective:   Ht 1.753 m (5' 9\")   BMI 25.40 kg/m²     Physical Exam:  Constitutional: Alert, no distress, well-groomed.  Skin: No rashes in visible areas.  Eye: Round. Conjunctiva clear, lids normal. No icterus.   ENMT: Lips pink without lesions, good dentition, moist mucous membranes. Phonation normal.  Neck: No masses, no thyromegaly. Moves freely without pain.  Respiratory: Unlabored respiratory effort, no cough or audible wheeze  Psych: Alert and oriented x3, normal affect and mood.     Assessment and Plan:   The following treatment plan was discussed:     1. Depression, unspecified depression type  -Symptoms depression appear to be returning, mostly due to very rigorous work schedule.  We will write a letter stating that patient needs to be excused from work at least 1 day a week, preferably on Sunday, in order to recuperate and continued working on preventing worsening of depressive symptoms.  Patient is not interested in starting medication at this time.  Given that we are seeing return of depression symptoms I do recommend some form of treatment and will refer to counseling which patient agrees to.  Patient will be following up with psychology.  If symptoms do worsen or persist before then we will follow-up " with me immediately.  - Referral to Psychology    2. Erectile dysfunction, unspecified erectile dysfunction type  -Chronic condition, stable with no concerns.  Will refill Viagra at this time.  Discussed appropriate to medication and potential side effects, return precautions.  - sildenafil citrate (VIAGRA) 50 MG tablet; Take 1 Tablet by mouth 1 time a day as needed for Erectile Dysfunction.  Dispense: 90 Tablet; Refill: 0      Follow-up: No follow-ups on file.

## 2023-05-18 NOTE — LETTER
May 18, 2023       Patient: Gadiel Edgar   YOB: 1980   Date of Visit: 5/18/2023         To Whom It May Concern:    Mr. Edgar is a patient of mine and under my care. He has a previous diagnosis of depression and recently has had some return of symptoms. Symptoms of depression appear to be exacerbated by current work schedule. In my medical opinion, I recommend that Gadiel Edgar only work 6 days a week and be allowed to be at home on Sundays in order to recuperate and better treat depression.     If you have any questions or concerns, please don't hesitate to call [phone]          Sincerely,          Kavon Mcfadden M.D.  Electronically Signed

## 2023-05-18 NOTE — LETTER
May 18, 2023         Patient: Gadiel Edgar   YOB: 1980   Date of Visit: 5/18/2023           To Whom it May Concern:     Mr. Edgar is a patient of mine and under my care. He has a previous diagnosis of depression and recently has had some return of symptoms. Symptoms of depression appear to be exacerbated by current work schedule. In my medical opinion, I recommend that Gadiel Edgar only work 6 days a week and be allowed to be at home on Sundays for the next year in order to recuperate and better treat depression.     If you have any questions or concerns, please don't hesitate to call.        Sincerely,         Kavon Mcfadden M.D.  Electronically Signed

## 2023-05-19 NOTE — TELEPHONE ENCOUNTER
1. Caller Name: Gadiel Edgar                          Call Back Number: [phone] (home) [phone] (work)        How would the patient prefer to be contacted with a response: Phone call do NOT leave a detailed message    Patient called and LVM requesting work letter, would to have it state indefinitely or one year. Please advise, Patient did not leave details regarding what kind of letter he needed.     Virtual visit today regarding it

## 2023-07-05 ENCOUNTER — OFFICE VISIT (OUTPATIENT)
Dept: URGENT CARE | Facility: CLINIC | Age: 43
End: 2023-07-05
Payer: COMMERCIAL

## 2023-07-05 VITALS
OXYGEN SATURATION: 96 % | DIASTOLIC BLOOD PRESSURE: 64 MMHG | SYSTOLIC BLOOD PRESSURE: 126 MMHG | HEART RATE: 78 BPM | HEIGHT: 69 IN | BODY MASS INDEX: 24.88 KG/M2 | RESPIRATION RATE: 14 BRPM | TEMPERATURE: 97.7 F | WEIGHT: 168 LBS

## 2023-07-05 DIAGNOSIS — J22 LOWER RESP. TRACT INFECTION: ICD-10-CM

## 2023-07-05 PROCEDURE — 3074F SYST BP LT 130 MM HG: CPT | Performed by: REGISTERED NURSE

## 2023-07-05 PROCEDURE — 3078F DIAST BP <80 MM HG: CPT | Performed by: REGISTERED NURSE

## 2023-07-05 PROCEDURE — 99214 OFFICE O/P EST MOD 30 MIN: CPT | Performed by: REGISTERED NURSE

## 2023-07-05 RX ORDER — AZITHROMYCIN 250 MG/1
TABLET, FILM COATED ORAL
Qty: 6 TABLET | Refills: 0 | Status: SHIPPED | OUTPATIENT
Start: 2023-07-05 | End: 2023-09-04

## 2023-07-05 RX ORDER — ALBUTEROL SULFATE 90 UG/1
2 AEROSOL, METERED RESPIRATORY (INHALATION) EVERY 6 HOURS PRN
Qty: 8.5 G | Refills: 0 | Status: SHIPPED | OUTPATIENT
Start: 2023-07-05

## 2023-07-05 ASSESSMENT — FIBROSIS 4 INDEX: FIB4 SCORE: 0.44

## 2023-07-05 ASSESSMENT — ENCOUNTER SYMPTOMS
DIARRHEA: 0
PALPITATIONS: 0
NAUSEA: 0
EYE PAIN: 0
COUGH: 1
WHEEZING: 1
WEAKNESS: 0
EYE DISCHARGE: 0
SHORTNESS OF BREATH: 1
ABDOMINAL PAIN: 0
DIZZINESS: 0
NECK PAIN: 0
SPUTUM PRODUCTION: 1
SORE THROAT: 0
SENSORY CHANGE: 0
TINGLING: 0
VOMITING: 0
CHILLS: 1
MYALGIAS: 1
HEADACHES: 0
FEVER: 0

## 2023-07-05 NOTE — PROGRESS NOTES
Subjective:   Gadiel Edgar is a 42 y.o. male who presents for Flu Like Symptoms (Ache , cough lingering / wheezing )      HPI  Has had persistent cough, productive yellowish sputum, chills and body aches. Has felt he is wheezing as well. Has not been using OTC medications or inhalers. Pertinent hx of pneumonia. Does smoke 6-7 cigarettes a day. No known exposures. Immunizations current.    Review of Systems   Constitutional:  Positive for chills and malaise/fatigue. Negative for fever.   HENT:  Negative for congestion, ear discharge, ear pain, hearing loss and sore throat.    Eyes:  Negative for pain and discharge.   Respiratory:  Positive for cough, sputum production, shortness of breath and wheezing.    Cardiovascular:  Negative for chest pain, palpitations and leg swelling.   Gastrointestinal:  Negative for abdominal pain, diarrhea, nausea and vomiting.   Musculoskeletal:  Positive for myalgias. Negative for neck pain.   Skin:  Negative for rash.   Neurological:  Negative for dizziness, tingling, sensory change, weakness and headaches.       No Known Allergies    Patient Active Problem List    Diagnosis Date Noted    Dyslipidemia 02/21/2020    Tobacco abuse 02/21/2020    Ocular migraine 02/21/2020       Current Outpatient Medications Ordered in Epic   Medication Sig Dispense Refill    azithromycin (ZITHROMAX) 250 MG Tab Take 2 tabs PO on day 1, take 1 tab PO day 2-5 6 Tablet 0    albuterol 108 (90 Base) MCG/ACT Aero Soln inhalation aerosol Inhale 2 Puffs every 6 hours as needed for Shortness of Breath. 8.5 g 0    fluticasone (FLONASE) 50 MCG/ACT nasal spray Spray 1 Spray in nose every day. 16 g 0    sildenafil citrate (VIAGRA) 50 MG tablet Take 1 Tablet by mouth 1 time a day as needed for Erectile Dysfunction. 90 Tablet 0     No current Epic-ordered facility-administered medications on file.       No past surgical history on file.    Social History     Tobacco Use    Smoking status: Every Day      "Packs/day: 0.25     Types: Cigars, Cigarettes    Smokeless tobacco: Former     Types: Chew    Tobacco comments:     since 2006    Vaping Use    Vaping Use: Never used   Substance Use Topics    Alcohol use: Yes     Alcohol/week: 0.0 oz     Comment: Rare    Drug use: Yes     Types: Marijuana     Comment: occ       family history is not on file.     Problem list, medications, and allergies reviewed by myself today in Epic.     Objective:   /64 (BP Location: Right arm, Patient Position: Sitting, BP Cuff Size: Adult)   Pulse 78   Temp 36.5 °C (97.7 °F)   Resp 14   Ht 1.753 m (5' 9\")   Wt 76.2 kg (168 lb)   SpO2 96%   BMI 24.81 kg/m²     Physical Exam  Vitals and nursing note reviewed.   Constitutional:       General: He is not in acute distress.     Appearance: Normal appearance. He is well-developed. He is not ill-appearing, toxic-appearing or diaphoretic.   HENT:      Head: Normocephalic and atraumatic.      Right Ear: Tympanic membrane, ear canal and external ear normal.      Left Ear: Tympanic membrane, ear canal and external ear normal.      Nose: Nose normal. No congestion or rhinorrhea.      Mouth/Throat:      Mouth: Mucous membranes are moist.      Pharynx: Oropharynx is clear. No oropharyngeal exudate or posterior oropharyngeal erythema.   Eyes:      General:         Right eye: No discharge.         Left eye: No discharge.      Conjunctiva/sclera: Conjunctivae normal.   Cardiovascular:      Rate and Rhythm: Normal rate and regular rhythm.      Pulses: Normal pulses.      Heart sounds: Normal heart sounds. No murmur heard.  Pulmonary:      Effort: Pulmonary effort is normal. No respiratory distress.      Breath sounds: Examination of the right-lower field reveals decreased breath sounds. Examination of the left-lower field reveals decreased breath sounds. Decreased breath sounds present. No wheezing, rhonchi or rales.   Chest:      Chest wall: No tenderness.   Musculoskeletal:         General: No " swelling or tenderness.      Cervical back: Normal range of motion and neck supple.      Right lower leg: No edema.      Left lower leg: No edema.   Lymphadenopathy:      Cervical: No cervical adenopathy.   Skin:     General: Skin is warm and dry.   Neurological:      General: No focal deficit present.      Mental Status: He is alert and oriented to person, place, and time.   Psychiatric:         Mood and Affect: Mood normal.         Assessment/Plan:     Diagnosis and associated orders:   1. Lower resp. tract infection  azithromycin (ZITHROMAX) 250 MG Tab    albuterol 108 (90 Base) MCG/ACT Aero Soln inhalation aerosol         Comments/MDM:     Non-toxic appearance, vital signs within normal limits.   No red flag signs or symptoms  Persistent harsh cough is productive with thick sputum, has been occurring for over a month he is now having body aches and chills which is worrisome for worsening infection.  On auscultation he is diminished bibasilarly.  Does have a history of pneumonia and also is a smoker which increases risk of complications from lower respiratory tract infections  Will treat aggressively with antibiotic azithromycin, also provide albuterol for bronchodilation, smoking cessation  Recommend adequate hydration, rest, deep breathing and coughing, ambulation as tolerated, OTC age appropriate medications  Follow up with primary care provider          Differential diagnosis, natural history, supportive care, and indications for immediate follow-up discussed.    Return to clinic or go to ED if symptoms worsen or persist. Indications for ED discussed at length. Patient/Parent/Guardian voices understanding. Follow-up with your primary care provider in 3-5 days. Red flag symptoms discussed. All side effects of medication discussed including allergic response, GI upset, tendon injury, rash, sedation etc.    I personally reviewed prior external notes and test results pertinent to today's visit as well as  additional imaging and testing completed in clinic today.     Please note that this dictation was created using voice recognition software. I have made every reasonable attempt to correct obvious errors, but I expect that there are errors of grammar and possibly content that I did not discover before finalizing the note.    This note was electronically signed by ANDREY Chaidez

## 2023-07-05 NOTE — LETTER
July 5, 2023         Patient: Gadiel Edgar   YOB: 1980   Date of Visit: 7/5/2023           To Whom it May Concern:    Gadiel Edgar was seen in my clinic on 7/5/2023. He may return to work on 07/07/23.    If you have any questions or concerns, please don't hesitate to call.        Sincerely,           ANDREY Chaidez  Electronically Signed

## 2023-07-19 ENCOUNTER — TELEMEDICINE (OUTPATIENT)
Dept: MEDICAL GROUP | Facility: LAB | Age: 43
End: 2023-07-19
Payer: COMMERCIAL

## 2023-07-19 VITALS — WEIGHT: 168 LBS | BODY MASS INDEX: 24.88 KG/M2 | HEIGHT: 69 IN

## 2023-07-19 DIAGNOSIS — F33.1 MODERATE EPISODE OF RECURRENT MAJOR DEPRESSIVE DISORDER (HCC): ICD-10-CM

## 2023-07-19 PROCEDURE — 99213 OFFICE O/P EST LOW 20 MIN: CPT | Mod: 95 | Performed by: FAMILY MEDICINE

## 2023-07-19 ASSESSMENT — FIBROSIS 4 INDEX: FIB4 SCORE: 0.44

## 2023-07-19 NOTE — LETTER
July 19, 2023       Patient: Gadiel Edgar   YOB: 1980   Date of Visit: 7/19/2023         To Whom It May Concern:     Mr. Edgar is a patient of mine and under my care. He has a previous diagnosis of depression and recently has had some return of symptoms. Symptoms of depression appear to be exacerbated by current work schedule. In my medical opinion, I recommend that Gadiel Edgar only work 6 days a week and be allowed to be at home on Sundays for the next year (until 07/19/24) in order to recuperate and better treat depression.     If you have any questions or concerns, please don't hesitate to call [phone]          Sincerely,          Kavon Mcfadden M.D.  Electronically Signed

## 2023-07-19 NOTE — LETTER
July 19, 2023       Patient: Gadiel Edgar   YOB: 1980   Date of Visit: 7/19/2023       To Whom it May Concern:     Mr. Edgar is a patient of mine and under my care. He has a previous diagnosis of depression and recently has had some return of symptoms. Symptoms of depression appear to be exacerbated by current work schedule. In my medical opinion, I recommend that Gadiel Edgar only work 6 days a week and be allowed to be at home on Sundays for the next year (until 07/19/23) in order to recuperate and better treat depression.     If you have any questions or concerns, please don't hesitate to call [phone]          Sincerely,          Kavon Mcfadden M.D.  Electronically Signed

## 2023-07-19 NOTE — PROGRESS NOTES
"Virtual Visit: Established Patient   This visit was conducted via Zoom using secure and encrypted videoconferencing technology.   The patient was in their home in the [facility].    The patient's identity was confirmed and verbal consent was obtained for this virtual visit.     Subjective:   CC:   Chief Complaint   Patient presents with    Patient Question     Extend his medical note, Sundays.  note.        Gadiel Edgar is a 42 y.o. male presenting for evaluation and management of:    #Depression   -Patient been working on better control depression by visiting counseling.  We have also written the letter so that he only works 6 days a week instead of 7.  He is taking the time off to spend with family into better repair himself for the upcoming week.  This is helped significantly and at this time he wishes to continue current regimen biopsy only working 6 days a week.  He currently denies any anxiety, depression, insomnia, suicidal/homicidal ideations.    ROS   -See above.    Current medicines (including changes today)  Current Outpatient Medications   Medication Sig Dispense Refill    azithromycin (ZITHROMAX) 250 MG Tab Take 2 tabs PO on day 1, take 1 tab PO day 2-5 6 Tablet 0    albuterol 108 (90 Base) MCG/ACT Aero Soln inhalation aerosol Inhale 2 Puffs every 6 hours as needed for Shortness of Breath. 8.5 g 0    sildenafil citrate (VIAGRA) 50 MG tablet Take 1 Tablet by mouth 1 time a day as needed for Erectile Dysfunction. 90 Tablet 0    fluticasone (FLONASE) 50 MCG/ACT nasal spray Spray 1 Spray in nose every day. 16 g 0     No current facility-administered medications for this visit.       Patient Active Problem List    Diagnosis Date Noted    Dyslipidemia 2020    Tobacco abuse 2020    Ocular migraine 2020        Objective:   Ht 1.753 m (5' 9.02\")   Wt 76.2 kg (168 lb)   BMI 24.80 kg/m²     Physical Exam:  Constitutional: Alert, no distress, well-groomed.  Skin: No rashes " in visible areas.  Eye: Round. Conjunctiva clear, lids normal. No icterus.   ENMT: Lips pink without lesions, good dentition, moist mucous membranes. Phonation normal.  Neck: No masses, no thyromegaly. Moves freely without pain.  Respiratory: Unlabored respiratory effort, no cough or audible wheeze  Psych: Alert and oriented x3, normal affect and mood.     Assessment and Plan:   The following treatment plan was discussed:     1. Moderate episode of recurrent major depressive disorder (HCC)  -Given significant improvement by taking 1 day off a week and continuing with therapy I would recommend we will continue with this regimen.  We will write letter requesting that patient only works 6 days a week and will send the patient to return to work.  Patient will continue to monitor symptoms will follow-up if symptoms persist or worsen.    Follow-up: No follow-ups on file.

## 2023-09-04 ENCOUNTER — OFFICE VISIT (OUTPATIENT)
Dept: URGENT CARE | Facility: CLINIC | Age: 43
End: 2023-09-04
Payer: COMMERCIAL

## 2023-09-04 VITALS
HEART RATE: 77 BPM | RESPIRATION RATE: 18 BRPM | OXYGEN SATURATION: 96 % | WEIGHT: 170 LBS | BODY MASS INDEX: 25.18 KG/M2 | DIASTOLIC BLOOD PRESSURE: 84 MMHG | TEMPERATURE: 97.7 F | HEIGHT: 69 IN | SYSTOLIC BLOOD PRESSURE: 122 MMHG

## 2023-09-04 DIAGNOSIS — J06.9 VIRAL UPPER RESPIRATORY TRACT INFECTION: ICD-10-CM

## 2023-09-04 LAB
FLUAV RNA SPEC QL NAA+PROBE: NEGATIVE
FLUBV RNA SPEC QL NAA+PROBE: NEGATIVE
RSV RNA SPEC QL NAA+PROBE: NEGATIVE
SARS-COV-2 RNA RESP QL NAA+PROBE: NEGATIVE

## 2023-09-04 PROCEDURE — 3074F SYST BP LT 130 MM HG: CPT | Performed by: NURSE PRACTITIONER

## 2023-09-04 PROCEDURE — 3079F DIAST BP 80-89 MM HG: CPT | Performed by: NURSE PRACTITIONER

## 2023-09-04 PROCEDURE — 99214 OFFICE O/P EST MOD 30 MIN: CPT | Performed by: NURSE PRACTITIONER

## 2023-09-04 PROCEDURE — 0241U POCT CEPHEID COV-2, FLU A/B, RSV - PCR: CPT | Performed by: NURSE PRACTITIONER

## 2023-09-04 ASSESSMENT — FIBROSIS 4 INDEX: FIB4 SCORE: 0.45

## 2023-09-04 NOTE — LETTER
September 4, 2023       Patient: Gadiel Edgar   YOB: 1980   Date of Visit: 9/4/2023         To Whom It May Concern:        In my medical opinion, I recommend that Gadiel Edgar return to full duty, no restrictions on 09/06/23          Sincerely,          MOJGAN Chase.  Electronically Signed

## 2023-09-04 NOTE — PROGRESS NOTES
Gadiel Edgar is a 43 y.o. male who presents for Pharyngitis (X1day, Body aches, fever, nasal congestion, has a little dry cough, states he was working out in the rain and got sick, fatigue, )      HPI  This is a new problem. Gadiel Edgar is a 43 y.o. patient who presents to urgent care with c/o: Started feeling sick yesterday.  Sore throat, body aches, fever, nasal congestion and slight cough that is slowly getting worse.  He states he is working on the rain the last few days.  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  He believes the combination of working in that area as well as the rain that he was working in got him sick.  He has no known exposures to ill persons directly.  Treatments tried rest, no other aggravating leaving factors.        ROS See HPI--denies SOB, CP, dizziness, NVD, otalgia, myalgia.    Allergies:     No Known Allergies    PMSFS Hx:  History reviewed. No pertinent past medical history.  History reviewed. No pertinent surgical history.  No family history on file.  Social History     Tobacco Use    Smoking status: Every Day     Current packs/day: 0.25     Types: Cigars, Cigarettes    Smokeless tobacco: Former     Types: Chew    Tobacco comments:     since 2006    Substance Use Topics    Alcohol use: Yes     Alcohol/week: 0.0 oz     Comment: Rare       Problems:   Patient Active Problem List   Diagnosis    Dyslipidemia    Tobacco abuse    Ocular migraine       Medications:   Current Outpatient Medications on File Prior to Visit   Medication Sig Dispense Refill    albuterol 108 (90 Base) MCG/ACT Aero Soln inhalation aerosol Inhale 2 Puffs every 6 hours as needed for Shortness of Breath. 8.5 g 0    sildenafil citrate (VIAGRA) 50 MG tablet Take 1 Tablet by mouth 1 time a day as needed for Erectile Dysfunction. 90 Tablet 0    fluticasone (FLONASE) 50 MCG/ACT nasal spray Spray 1 Spray in nose every day. 16 g 0     "azithromycin (ZITHROMAX) 250 MG Tab Take 2 tabs PO on day 1, take 1 tab PO day 2-5 (Patient not taking: Reported on 9/4/2023) 6 Tablet 0     No current facility-administered medications on file prior to visit.          Objective:     /84   Pulse 77   Temp 36.5 °C (97.7 °F) (Temporal)   Resp 18   Ht 1.753 m (5' 9\")   Wt 77.1 kg (170 lb)   SpO2 96%   BMI 25.10 kg/m²     Physical Exam  Vitals and nursing note reviewed.   Constitutional:       General: He is not in acute distress.     Appearance: He is well-developed. He is not ill-appearing or toxic-appearing.   HENT:      Head: Normocephalic.      Right Ear: Hearing, tympanic membrane, ear canal and external ear normal.      Left Ear: Hearing, tympanic membrane, ear canal and external ear normal.      Nose: Mucosal edema and rhinorrhea present.      Right Sinus: No maxillary sinus tenderness or frontal sinus tenderness.      Left Sinus: No maxillary sinus tenderness or frontal sinus tenderness.      Mouth/Throat:      Mouth: Mucous membranes are moist.      Pharynx: Uvula midline. Posterior oropharyngeal erythema present. No oropharyngeal exudate or uvula swelling.      Tonsils: No tonsillar abscesses.   Eyes:      General: Lids are normal.      Conjunctiva/sclera: Conjunctivae normal.   Neck:      Trachea: Trachea and phonation normal.   Cardiovascular:      Rate and Rhythm: Normal rate and regular rhythm.      Pulses: Normal pulses.      Heart sounds: Normal heart sounds.   Pulmonary:      Effort: Pulmonary effort is normal.      Breath sounds: Normal breath sounds.   Musculoskeletal:      Cervical back: Full passive range of motion without pain, normal range of motion and neck supple. No muscular tenderness. Normal range of motion.   Lymphadenopathy:      Cervical: No cervical adenopathy.      Upper Body:      Right upper body: No supraclavicular adenopathy.      Left upper body: No supraclavicular adenopathy.   Skin:     General: Skin is warm and dry. "      Capillary Refill: Capillary refill takes less than 2 seconds.   Neurological:      Mental Status: He is alert and oriented to person, place, and time.   Psychiatric:         Mood and Affect: Mood normal.         Behavior: Behavior normal. Behavior is cooperative.         Thought Content: Thought content normal.       Results for orders placed or performed in visit on 09/04/23   POCT CEPHEID COV-2, FLU A/B, RSV - PCR   Result Value Ref Range    SARS-CoV-2 by PCR Negative Negative, Invalid    Influenza virus A RNA Negative Negative, Invalid    Influenza virus B, PCR Negative Negative, Invalid    RSV, PCR Negative Negative, Invalid         Assessment /Associated Orders:      1. Viral upper respiratory tract infection  POCT CEPHEID COV-2, FLU A/B, RSV - PCR          Medical Decision Making:      Gadiel is a very pleasant 42 y/o male. He is clinically stable at today's acute urgent care visit.  No acute distress noted.  VSS. Appropriate for outpatient care at this time.   Acute problem today with uncertain prognosis.   Discussed that this illness appears to be viral in nature. I did not see any evidence of a bacterial process on exam today.   OTC medications can be used for symptom relief. Follow manufacturers guidelines for dosing and instructions.  These OTC medications will not make you better any faster, but can help reduce your discomfort.   OTC Antipyretic of choice (Acetaminophen, Ibuprofen) for fevers greater than or equal to 101.5 * F or 38.6*C   Keep well hydrated  Increase rest    Discussed Dx, management options (risks,benefits, and alternatives to planned treatment), natural progression and supportive care.  Expressed understanding and the treatment plan was agreed upon.   Questions were encouraged and answered   Return to urgent care prn if new or worsening sx or if there is no improvement in condition prn.    Educated in Red flags and indications to immediately call 911 or present to the Emergency  Department.       Time I spent evaluating Gadiel Edgar in urgent care today was 32  minutes. This time includes preparing for visit, reviewing any pertinent notes or test results, counseling/education, exam, obtaining HPI, interpretation of lab tests, medication management and documentation as indicated above.Time does not include separately billable procedures noted .       Please note that this dictation was created using voice recognition software. I have worked with consultants from the vendor as well as technical experts from [facility] to optimize the interface. I have made every reasonable attempt to correct obvious errors, but I expect that there are errors of grammar and possibly content that I did not discover before finalizing the note.  This note was electronically signed by provider

## 2023-09-18 ENCOUNTER — OFFICE VISIT (OUTPATIENT)
Dept: URGENT CARE | Facility: CLINIC | Age: 43
End: 2023-09-18
Payer: COMMERCIAL

## 2023-09-18 VITALS
WEIGHT: 170 LBS | BODY MASS INDEX: 25.18 KG/M2 | RESPIRATION RATE: 18 BRPM | OXYGEN SATURATION: 98 % | SYSTOLIC BLOOD PRESSURE: 110 MMHG | HEART RATE: 70 BPM | DIASTOLIC BLOOD PRESSURE: 60 MMHG | HEIGHT: 69 IN | TEMPERATURE: 97.1 F

## 2023-09-18 DIAGNOSIS — B34.9 ACUTE VIRAL SYNDROME: ICD-10-CM

## 2023-09-18 LAB
FLUAV RNA SPEC QL NAA+PROBE: NEGATIVE
FLUBV RNA SPEC QL NAA+PROBE: NEGATIVE
RSV RNA SPEC QL NAA+PROBE: NEGATIVE
SARS-COV-2 RNA RESP QL NAA+PROBE: POSITIVE

## 2023-09-18 PROCEDURE — 99213 OFFICE O/P EST LOW 20 MIN: CPT

## 2023-09-18 PROCEDURE — 0241U POCT CEPHEID COV-2, FLU A/B, RSV - PCR: CPT

## 2023-09-18 PROCEDURE — 3078F DIAST BP <80 MM HG: CPT

## 2023-09-18 PROCEDURE — 3074F SYST BP LT 130 MM HG: CPT

## 2023-09-18 ASSESSMENT — FIBROSIS 4 INDEX: FIB4 SCORE: 0.45

## 2023-09-18 NOTE — PROGRESS NOTES
Subjective:   Gadiel Edgar is a 43 y.o. male who presents for Body Aches (X 4 days, tested at home for Covid yesterday: Positive, head congestion )      HPI:    Patient presents to urgent care with concerns of rhinorrhea, nasal congestion, headache, sore throat, cough.    Onset was 4 days ago  Reports wheezing, chest tightness, SOB  Denies measured fever at home, endorses chills.  His wife has also been sick with similar illness  Tested positive for COVID yesterday  Tolerating solids and fluids  Reports normal urinary output  He is an active smoker  Reports mild SOB with activity, denies wheezing.    ROS As above in HPI    Medications:    Current Outpatient Medications on File Prior to Visit   Medication Sig Dispense Refill    sildenafil citrate (VIAGRA) 50 MG tablet Take 1 Tablet by mouth 1 time a day as needed for Erectile Dysfunction. 90 Tablet 0    fluticasone (FLONASE) 50 MCG/ACT nasal spray Spray 1 Spray in nose every day. 16 g 0    albuterol 108 (90 Base) MCG/ACT Aero Soln inhalation aerosol Inhale 2 Puffs every 6 hours as needed for Shortness of Breath. (Patient not taking: Reported on 9/18/2023) 8.5 g 0     No current facility-administered medications on file prior to visit.        Allergies:   Patient has no known allergies.    Problem List:   Patient Active Problem List   Diagnosis    Dyslipidemia    Tobacco abuse    Ocular migraine        Surgical History:  No past surgical history on file.    Past Social Hx:   Social History     Tobacco Use    Smoking status: Every Day     Current packs/day: 0.25     Types: Cigars, Cigarettes    Smokeless tobacco: Former     Types: Chew    Tobacco comments:     since 2006    Vaping Use    Vaping Use: Never used   Substance Use Topics    Alcohol use: Not Currently     Comment: Rare    Drug use: Not Currently     Types: Marijuana     Comment: occ          Problem list, medications, and allergies reviewed by myself today in Epic.     Objective:     /60 (BP  "Location: Left arm, Patient Position: Sitting, BP Cuff Size: Adult)   Pulse 70   Temp 36.2 °C (97.1 °F) (Temporal)   Resp 18   Ht 1.753 m (5' 9\")   Wt 77.1 kg (170 lb)   SpO2 98%   BMI 25.10 kg/m²     Physical Exam  Vitals and nursing note reviewed.   Constitutional:       General: He is not in acute distress.     Appearance: Normal appearance. He is not ill-appearing or diaphoretic.   HENT:      Head: Normocephalic.      Right Ear: Tympanic membrane and ear canal normal.      Left Ear: Tympanic membrane and ear canal normal.      Nose: Congestion and rhinorrhea present.      Mouth/Throat:      Mouth: Mucous membranes are moist.      Pharynx: Oropharynx is clear. Posterior oropharyngeal erythema present.   Cardiovascular:      Rate and Rhythm: Normal rate and regular rhythm.      Heart sounds: Normal heart sounds. No murmur heard.     No friction rub. No gallop.   Pulmonary:      Effort: Pulmonary effort is normal. No respiratory distress.      Breath sounds: Normal breath sounds. No stridor. No wheezing, rhonchi or rales.   Chest:      Chest wall: No tenderness.   Abdominal:      General: Abdomen is flat. Bowel sounds are normal.      Palpations: Abdomen is soft.   Skin:     General: Skin is warm and dry.      Capillary Refill: Capillary refill takes less than 2 seconds.      Findings: No rash.   Neurological:      Mental Status: He is alert and oriented to person, place, and time.         Assessment/Plan:       Results for orders placed or performed in visit on 09/18/23   POCT CEPHEID COV-2, FLU A/B, RSV - PCR   Result Value Ref Range    SARS-CoV-2 by PCR Positive (A) Negative, Invalid    Influenza virus A RNA Negative Negative, Invalid    Influenza virus B, PCR Negative Negative, Invalid    RSV, PCR Negative Negative, Invalid       Diagnosis and associated orders:   1. Acute viral syndrome  - POCT CEPHEID COV-2, FLU A/B, RSV - PCR        Comments/MDM:     Patient tested positive for COVID  Vital signs are " stable, patient is nontoxic. No signs of respiratory distress.  Supportive measures encouraged: Rest, increased oral hydration, NSAIDs/tylenol as needed per package instructions, decongestant, warm humidification, otc antihistamines, Flonase, cough suppressant as needed   Albuterol ordered for symptomatic relief and in case he develops shortness of breath.  Strict return to ER precautions reviewed  Follow-up with primary care as advised  Work note provided        Please note that this dictation was created using voice recognition software. I have made a reasonable attempt to correct obvious errors, but I expect that there are errors of grammar and possibly content that I did not discover before finalizing the note.    This note was electronically signed by TIANA Uribe

## 2023-09-18 NOTE — LETTER
September 18, 2023    To Whom It May Concern:         This is confirmation that Gadiel Correa Herve attended his scheduled appointment with ANDREY Washington on 9/18/23.    He may return to work on 09/22/23.          If you have any questions please do not hesitate to call me at the phone number listed below.    Sincerely,          PORTIA Washington.  [phone]

## 2023-12-06 ENCOUNTER — APPOINTMENT (OUTPATIENT)
Dept: RADIOLOGY | Facility: MEDICAL CENTER | Age: 43
End: 2023-12-06
Attending: EMERGENCY MEDICINE
Payer: COMMERCIAL

## 2023-12-06 ENCOUNTER — HOSPITAL ENCOUNTER (EMERGENCY)
Facility: MEDICAL CENTER | Age: 43
End: 2023-12-06
Attending: EMERGENCY MEDICINE
Payer: COMMERCIAL

## 2023-12-06 VITALS
WEIGHT: 176.15 LBS | HEART RATE: 71 BPM | DIASTOLIC BLOOD PRESSURE: 90 MMHG | RESPIRATION RATE: 18 BRPM | SYSTOLIC BLOOD PRESSURE: 133 MMHG | BODY MASS INDEX: 26.09 KG/M2 | OXYGEN SATURATION: 95 % | TEMPERATURE: 97.4 F | HEIGHT: 69 IN

## 2023-12-06 DIAGNOSIS — S06.0X0A CONCUSSION WITHOUT LOSS OF CONSCIOUSNESS, INITIAL ENCOUNTER: ICD-10-CM

## 2023-12-06 PROCEDURE — 99283 EMERGENCY DEPT VISIT LOW MDM: CPT

## 2023-12-06 PROCEDURE — 70450 CT HEAD/BRAIN W/O DYE: CPT

## 2023-12-06 RX ORDER — ACETAMINOPHEN 500 MG
1000 TABLET ORAL ONCE
Status: DISCONTINUED | OUTPATIENT
Start: 2023-12-06 | End: 2023-12-06

## 2023-12-06 ASSESSMENT — FIBROSIS 4 INDEX: FIB4 SCORE: 0.45

## 2023-12-07 NOTE — ED TRIAGE NOTES
"Chief Complaint   Patient presents with    Head Injury     Reports \"I hit my R temple on part of the van yesterday. I turned and hit the top part of it\". Pt. Denies LOC with this, but states he had 2 syncopal episodes after this yesterday and had 3 episodes of vomiting since Denies blood thinner use. Reports injury to R elbow and R knee with syncopal episodes.      Ambulatory to triage with steady gait.  "

## 2023-12-07 NOTE — ED PROVIDER NOTES
"ED Provider Note    CHIEF COMPLAINT  Chief Complaint   Patient presents with    Head Injury     Reports \"I hit my R temple on part of the van yesterday. I turned and hit the top part of it\". Pt. Denies LOC with this, but states he had 2 syncopal episodes after this yesterday and had 3 episodes of vomiting since Denies blood thinner use. Reports injury to R elbow and R knee with syncopal episodes.        EXTERNAL RECORDS REVIEWED  Outpatient Notes from urgent care September 2023 for viral URI    HPI/ROS  LIMITATION TO HISTORY   Select: : None  OUTSIDE HISTORIAN(S):  none    Gadiel Edgar is a 43 y.o. male who presents after a head injury x 2.  Patient reports that yesterday he hit his right temple on a van, he reports he hit quite hard ago no LOC.  He did have some headache, he then went home, and when he was at home he began to feel nauseated.  He did have 3 episodes of nonbloody emesis and then he passed out.  As he was going upstairs after this he then passed out again.  He reports today he then hit his head again and has felt foggy all day with some headache.  He reports no return of any nausea or vomiting.  He reports no chest pain or palpitations, no lightheadedness, no focal weakness or numbness.  No visual symptoms.  No abdominal pain.  No recent illness fevers or chills.    PAST MEDICAL HISTORY   has a past medical history of Patient denies medical problems.    SURGICAL HISTORY  patient denies any surgical history    FAMILY HISTORY  History reviewed. No pertinent family history.    SOCIAL HISTORY  Social History     Tobacco Use    Smoking status: Every Day     Current packs/day: 0.25     Types: Cigars, Cigarettes    Smokeless tobacco: Former     Types: Chew    Tobacco comments:     since 2006    Vaping Use    Vaping Use: Never used   Substance and Sexual Activity    Alcohol use: Yes     Comment: Rare    Drug use: Not Currently     Types: Marijuana     Comment: occ    Sexual activity: Not on file " "      CURRENT MEDICATIONS  Home Medications       Reviewed by Keke Perkins R.N. (Registered Nurse) on 12/06/23 at 1801  Med List Status: Not Addressed     Medication Last Dose Status   albuterol 108 (90 Base) MCG/ACT Aero Soln inhalation aerosol  Active   fluticasone (FLONASE) 50 MCG/ACT nasal spray  Active   sildenafil citrate (VIAGRA) 50 MG tablet  Active                    ALLERGIES  No Known Allergies    PHYSICAL EXAM  VITAL SIGNS: BP (!) 141/92   Pulse 81   Temp 36.3 °C (97.4 °F) (Temporal)   Resp 20   Ht 1.753 m (5' 9\")   Wt 79.9 kg (176 lb 2.4 oz)   SpO2 94%   BMI 26.01 kg/m²    Constitutional: Alert in no apparent distress.  HENT: Tenderness over the right temple, no bogginess noted, no Drew's or raccoons, Bilateral external ears normal, Nose normal.   Eyes: Pupils are equal and reactive, Conjunctiva normal, Non-icteric.   Neck: Normal range of motion, No tenderness, Supple, No stridor.   Lymphatic: No lymphadenopathy noted.   Cardiovascular: Regular rate and rhythm, no murmurs.   Thorax & Lungs: Normal breath sounds, No respiratory distress, No wheezing, No chest tenderness.   Abdomen: Bowel sounds normal, Soft, No tenderness, No masses, No pulsatile masses. No peritoneal signs.  Skin: Warm, Dry, No erythema, No rash.   Back: No bony tenderness, No CVA tenderness.   Extremities: Intact distal pulses, No edema, No tenderness, No cyanosis, Negative Porsche's sign.  Musculoskeletal: Good range of motion in all major joints. No tenderness to palpation or major deformities noted.   Neurologic: Alert, cranial nerves intact, speech is appropriate or not slurred, upper extremities bilaterally exhibit no drift, no dysmetria, 5 out of 5 strength with bilateral bicep/tricep/, sensation intact to light touch throughout upper extremities. Lower extremities strength 5 out of 5 thigh extension/flexion/abduction/adduction, knee extension/flexion, dorsiflexion plantar flexion.   sensation intact to light " touch.  No focal deficits noted. Ambulates with steady gait  Psychiatric: Affect normal, Judgment normal, Mood normal.       DIAGNOSTIC STUDIES / PROCEDURES    RADIOLOGY  I have independently interpreted the diagnostic imaging associated with this visit and am waiting the final reading from the radiologist.   My preliminary interpretation is as follows: no bleed  Radiologist interpretation:   CT-HEAD W/O   Final Result         1.  No acute intracranial abnormality.               COURSE & MEDICAL DECISION MAKING        INITIAL ASSESSMENT, COURSE AND PLAN  Care Narrative: 7:38 PM  Patient is evaluated at the bedside, at this point differential includes closed head injury/concussion, subarachnoid, subdural, epidural or skull fracture.  I have ordered for CT head, he declines need for pain medication    8:39 PM  Patient is reevaluated, he is comfortable, updated on results and he is agreeable with discharge           PROBLEM LIST  # Concussion.  Patient presenting after head injury with some headache and fogginess as well as some nausea and vomiting and syncope Yesterday.  CT was obtained that shows no intracranial bleed or skull fracture.  He is neurologically intact.  I have discussed with him concussion home care and return precautions.      DISPOSITION AND DISCUSSIONS  Barriers to care at this time, including but not limited to:  none .     Decision tools and prescription drugs considered including, but not limited to:  Patient would not be low risk by [place] CT head criteria given his additional symptoms .    The patient will return for new or worsening symptoms and is stable at the time of discharge.    The patient is referred to a primary physician for blood pressure management, diabetic screening, and for all other preventative health concerns.        DISPOSITION:  Patient will be discharged home in stable condition.    FOLLOW UP:  Kavon Mcfadden M.D.  [id number]  [address]  [phone]    In 1 week  As needed      OUTPATIENT MEDICATIONS:  New Prescriptions    No medications on file         FINAL DIAGNOSIS  1. Concussion without loss of consciousness, initial encounter           Electronically signed by: Brain Delgado M.D., 12/6/2023 7:37 PM

## 2024-01-24 NOTE — PROGRESS NOTES
Subjective:      Gadiel Edgar is a 40 y.o. male who presents with Back Pain (x 1 day back pain, neck pain )        HPI  This is a new problem.   The patient presents to clinic secondary to a mechanical ground-level fall.  The patient is currently complaining of neck pain and low back pain x1 day.  The patient states he was hiking yesterday afternoon when he slipped and fell.  The patient states he landed on his low back/buttock region.  The patient states he hit his head as a result of the fall.  The patient states he was wearing a backpack at the time of the fall, and believes he hyper extended his neck.  The patient reports no LOC.  The patient states he felt slightly dizzy after the fall, but states this is now resolved.  The patient states overall he is feeling sore with localized pain to his neck and his low back.  The patient reports no associated radiation of pain to his upper or lower extremities.  No numbness, tingling, weakness of the upper or lower extremities.  No incontinence of bladder/bowel.  No paresthesias.  No difficulty walking.  No headaches.  No vomiting.  No vision changes.  The patient has taken Aleve for his current symptoms.    PMH:  has no past medical history on file.  MEDS:   Current Outpatient Medications:   •  Naproxen Sodium (ALEVE) 220 MG Cap, Take 2 Caps by mouth as needed., Disp: , Rfl:   •  fluticasone (FLONASE) 50 MCG/ACT nasal spray, Spray 1 Spray in nose every day., Disp: 16 g, Rfl: 0  •  Multiple Vitamin (MULTI-VITAMIN DAILY PO), Take  by mouth., Disp: , Rfl:   ALLERGIES: No Known Allergies  SURGHX: History reviewed. No pertinent surgical history.  SOCHX:  reports that he has been smoking cigars and cigarettes. He has been smoking about 0.25 packs per day. His smokeless tobacco use includes chew. He reports previous alcohol use. He reports previous drug use.  FH: Family history was reviewed, no pertinent findings to report    Review of Systems   Constitutional:  "Negative for fever.   HENT: Negative for congestion, ear pain and sore throat.    Eyes: Negative for blurred vision, double vision, discharge and redness.   Respiratory: Negative for cough and shortness of breath.    Cardiovascular: Negative for chest pain and leg swelling.   Gastrointestinal: Negative for nausea and vomiting.   Musculoskeletal: Positive for back pain and neck pain.   Skin: Negative for rash.   Neurological: Negative for headaches.          Objective:     /80 (BP Location: Left arm, Patient Position: Sitting, BP Cuff Size: Adult)   Pulse 61   Temp 36.1 °C (96.9 °F) (Temporal)   Resp 18   Ht 1.75 m (5' 8.9\")   Wt 80.3 kg (177 lb)   SpO2 98%   BMI 26.22 kg/m²      Physical Exam  Constitutional:       General: He is not in acute distress.     Appearance: Normal appearance. He is well-developed. He is not ill-appearing.   HENT:      Head: Normocephalic and atraumatic. No contusion or laceration.      Right Ear: External ear normal.      Left Ear: External ear normal.   Eyes:      Extraocular Movements: Extraocular movements intact.      Conjunctiva/sclera: Conjunctivae normal.      Pupils: Pupils are equal, round, and reactive to light.   Neck:      Musculoskeletal: Normal range of motion and neck supple. No neck rigidity.      Comments:   Cervical Spine:  Tenderness to the bilateral paraspinal regions of the cervical spine with tenderness extending distally across the bilateral trapezius muscles.  No midline/bony tenderness.  No palpable step-off.  No swelling.  No ecchymosis.  No erythema.  ROM intact -the patient demonstrates full active range of motion of the cervical spine  Neurovascular intact  Strength 5/5 -equal bilateral upper extremities   strength 5/5  Cardiovascular:      Rate and Rhythm: Normal rate and regular rhythm.      Heart sounds: Normal heart sounds.   Pulmonary:      Effort: Pulmonary effort is normal. No respiratory distress.      Breath sounds: No wheezing. "   Musculoskeletal:      Comments:   Lumbar Spine:  Tenderness to the bilateral paraspinal regions of the lumbar spine.  No midline/bony tenderness.  No palpable step-off.  No swelling.  No ecchymosis.  No erythema.  ROM intact -the patient demonstrates full active range of motion of the lumbar spine  Neurovascular intact  Strength 5/5 -equal bilateral lower extremities  Normal gait   Skin:     General: Skin is warm and dry.   Neurological:      General: No focal deficit present.      Mental Status: He is alert and oriented to person, place, and time.            Progress:  Toradol 30mg IM given in clinic  The patient reports improvement of his symptoms after the Toradol injection.  The patient tolerated this medication without immediate side effects.    The patient is also requesting x-rays of his low back at this time.    Lumbar Spine:   COMPARISON: 10/19/2009     FINDINGS:  Vertebral body height is maintained. There is minimal retrolisthesis of L4 on L5 and L1 on L2.  Disc spaces are maintained. There is mild osteophytic spurring from the superior endplates of the L4 and L5 vertebral bodies. SI joints are unremarkable.     IMPRESSION:  1.  No acute findings.    2.  Minimal retrolisthesis at L1-2 and L4-5.     Assessment/Plan:        1. Injury of low back, initial encounter  - DX-LUMBAR SPINE-2 OR 3 VIEWS; Future  - ketorolac (TORADOL) injection 30 mg    2. Strain of lumbar region, initial encounter  - cyclobenzaprine (FLEXERIL) 5 mg tablet; Take 1-2 Tabs by mouth at bedtime as needed.  Dispense: 20 Tab; Refill: 0  --Advised the patient to only take this medication at night, as it may cause drowsiness. Instructed the patient not to take the medication while at work, driving, or operating machinery.  Instructed the patient not to drink alcohol while taking this medication.    3. Strain of neck muscle, initial encounter  - cyclobenzaprine (FLEXERIL) 5 mg tablet; Take 1-2 Tabs by mouth at bedtime as needed.  Dispense:  20 Tab; Refill: 0  --Advised the patient to only take this medication at night, as it may cause drowsiness. Instructed the patient not to take the medication while at work, driving, or operating machinery.  Instructed the patient not to drink alcohol while taking this medication.    4. Fall, initial encounter    Differential diagnoses, supportive care, and indications for immediate follow-up discussed with patient.   Instructed to return to clinic or nearest emergency department for any change in condition, further concerns, or worsening of symptoms.    OTC NSAIDs for pain/discomfort  Alternate ice and heat to the affected area for symptomatic relief  Home stretches as discussed in clinic  Follow-up with PCP  Return to clinic or go to the ED if symptoms worsen or fail to improve, or if the patient should develop worsening/increasing back pain, neck pain, radiation of pain, numbness, tingling, or weakness to the extremities, incontinence of bladder/bowel, paresthesias, difficulty walking, headache, vision changes, nausea/vomiting, fever/chills, and/or any concerning symptoms.     Discussed plan with the patient, and he agrees to the above.    Please note that this dictation was created using voice recognition software. I have made every reasonable attempt to correct obvious errors, but I expect that there may be errors of grammar and possibly content that I did not discover before finalizing the note.        see RA note

## 2024-09-30 ENCOUNTER — OFFICE VISIT (OUTPATIENT)
Dept: MEDICAL GROUP | Facility: LAB | Age: 44
End: 2024-09-30
Payer: COMMERCIAL

## 2024-09-30 VITALS
HEIGHT: 69 IN | SYSTOLIC BLOOD PRESSURE: 114 MMHG | BODY MASS INDEX: 25.18 KG/M2 | HEART RATE: 68 BPM | OXYGEN SATURATION: 98 % | WEIGHT: 170 LBS | TEMPERATURE: 97.8 F | DIASTOLIC BLOOD PRESSURE: 72 MMHG | RESPIRATION RATE: 16 BRPM

## 2024-09-30 DIAGNOSIS — Z23 NEED FOR VACCINATION: ICD-10-CM

## 2024-09-30 DIAGNOSIS — Z12.11 SPECIAL SCREENING FOR MALIGNANT NEOPLASMS, COLON: ICD-10-CM

## 2024-09-30 DIAGNOSIS — F33.0 MILD EPISODE OF RECURRENT MAJOR DEPRESSIVE DISORDER (HCC): ICD-10-CM

## 2024-09-30 PROCEDURE — 90656 IIV3 VACC NO PRSV 0.5 ML IM: CPT | Performed by: FAMILY MEDICINE

## 2024-09-30 PROCEDURE — 3078F DIAST BP <80 MM HG: CPT | Performed by: FAMILY MEDICINE

## 2024-09-30 PROCEDURE — 90471 IMMUNIZATION ADMIN: CPT | Performed by: FAMILY MEDICINE

## 2024-09-30 PROCEDURE — 3074F SYST BP LT 130 MM HG: CPT | Performed by: FAMILY MEDICINE

## 2024-09-30 PROCEDURE — 99213 OFFICE O/P EST LOW 20 MIN: CPT | Mod: 25 | Performed by: FAMILY MEDICINE

## 2024-09-30 ASSESSMENT — FIBROSIS 4 INDEX: FIB4 SCORE: 0.46

## 2024-09-30 ASSESSMENT — PATIENT HEALTH QUESTIONNAIRE - PHQ9
SUM OF ALL RESPONSES TO PHQ QUESTIONS 1-9: 6
5. POOR APPETITE OR OVEREATING: 1 - SEVERAL DAYS
CLINICAL INTERPRETATION OF PHQ2 SCORE: 2

## 2024-09-30 NOTE — LETTER
September 30, 2024       Patient: Gadiel Edgar   YOB: 1980   Date of Visit: 9/30/2024         To Whom It May Concern:    In my medical opinion, I recommend that Gadiel Edgar reduce work hours due to current medical conditions. I recommend he only works 8 hours shifts with time off on Sundays and holidays.     If you have any questions or concerns, please don't hesitate to call [phone]          Sincerely,          Kavon Mcfadden M.D.

## 2024-09-30 NOTE — PROGRESS NOTES
"Verbal consent was acquired by the patient to use [employer] ambient listening note generation during this visit Yes    Subjective:   Gadiel Edgar is a 44 y.o. male here today for   Chief Complaint   Patient presents with    Paperwork     History of Present Illness  The patient is a 44-year-old male with a significant history of major depressive disorder. He is here today to complete FMLA paperwork due to ongoing work-related stress.    He reports feeling overwhelmed and exhausted due to his demanding work schedule of 12 to 14 hours a day, six days a week. His job requirements exacerbate his depressive symptoms. He has not been attending therapy sessions recently and is seeking to complete FMLA paperwork for a reduced work schedule to maintain his mental health. Specifically, he is requesting an 8-hour shift without working on Sundays.    Additionally, he mentions that he is due for a flu shot and inquires about the COVID-19 vaccines.      No Known Allergies      Current medicines (including changes today)  Current Outpatient Medications   Medication Sig Dispense Refill    albuterol 108 (90 Base) MCG/ACT Aero Soln inhalation aerosol Inhale 2 Puffs every 6 hours as needed for Shortness of Breath. (Patient not taking: Reported on 9/18/2023) 8.5 g 0    sildenafil citrate (VIAGRA) 50 MG tablet Take 1 Tablet by mouth 1 time a day as needed for Erectile Dysfunction. 90 Tablet 0    fluticasone (FLONASE) 50 MCG/ACT nasal spray Spray 1 Spray in nose every day. 16 g 0     No current facility-administered medications for this visit.     He  has a past medical history of Patient denies medical problems.    ROS   ROS  -See HPI     Objective:     Physical Exam:  /72   Pulse 68   Temp 36.6 °C (97.8 °F) (Temporal)   Resp 16   Ht 1.753 m (5' 9.02\")   Wt 77.1 kg (170 lb)   SpO2 98%  Body mass index is 25.09 kg/m².   Constitutional: Alert, no distress.  Skin: Warm, dry, good turgor, no rashes in visible " areas.  Eye: Equal, round and reactive, conjunctiva clear, lids normal.  ENMT: TM's clear bilaterally, lips without lesions, good dentition, oropharynx clear.  Neck: Trachea midline, no masses, no thyromegaly. No cervical or supraclavicular lymphadenopathy.  Respiratory: Unlabored respiratory effort, lungs clear to auscultation, no wheezes, no rhonchi.  Cardiovascular: Normal S1, S2, no murmur, no edema.  Abdomen: Soft, non-tender, no masses, no hepatosplenomegaly.  Psych: Alert and oriented x3, normal affect and mood.    Results    Depression Screening    Little interest or pleasure in doing things?  1 - several days  Feeling down, depressed , or hopeless? 1 - several days  Trouble falling or staying asleep, or sleeping too much?  1 - several days  Feeling tired or having little energy?  1 - several days  Poor appetite or overeating?  1 - several days  Feeling bad about yourself - or that you are a failure or have let yourself or your family down? 0 - not at all  Trouble concentrating on things, such as reading the newspaper or watching television? 1 - several days  Moving or speaking so slowly that other people could have noticed.  Or the opposite - being so fidgety or restless that you have been moving around a lot more than usual?  0 - not at all  Thoughts that you would be better off dead, or of hurting yourself?  0 - not at all  Patient Health Questionnaire Score: 6    Assessment and Plan:     Assessment & Plan  1. Major Depressive Disorder.  He is experiencing increased depressive symptoms due to long work hours. [facility] paperwork was completed, recommending an 8-hour workday with Sundays off to help manage his mental health. He continues with cognitive behavioral therapy.     2. Health Maintenance.  A Cologuard test was ordered for colon cancer screening. An influenza vaccine was administered during this visit. He was advised to receive the COVID-19 and RSV vaccines at [facility].      Orders:  1. Screening for  colorectal cancer    2. Special screening for malignant neoplasms, colon  - Cologuard® colon cancer screening    3. Need for vaccination  - INFLUENZA VACCINE TRI INJ (PF)        Followup: No follow-ups on file.         PLEASE NOTE: This dictation was created using voice recognition and [employer] ambient listening software. I have made every reasonable attempt to correct obvious errors, but I expect that there are errors of grammar and possibly content that I did not discover before finalizing the note.

## 2024-10-30 LAB — NONINV COLON CA DNA+OCC BLD SCRN STL QL: NEGATIVE

## 2024-11-01 ENCOUNTER — RESEARCH ENCOUNTER (OUTPATIENT)
Dept: RESEARCH | Facility: MEDICAL CENTER | Age: 44
End: 2024-11-01

## 2024-11-01 NOTE — RESEARCH NOTE
Patient has been marked as Interested. The initial follow-up message, which includes the consent form link, has been sent to the patient.    Eligible Studies: HNP/MNASH

## 2024-12-05 ENCOUNTER — HOSPITAL ENCOUNTER (EMERGENCY)
Facility: MEDICAL CENTER | Age: 44
End: 2024-12-05
Attending: EMERGENCY MEDICINE
Payer: COMMERCIAL

## 2024-12-05 VITALS
RESPIRATION RATE: 18 BRPM | DIASTOLIC BLOOD PRESSURE: 75 MMHG | HEIGHT: 69 IN | TEMPERATURE: 97.3 F | SYSTOLIC BLOOD PRESSURE: 128 MMHG | WEIGHT: 176.59 LBS | BODY MASS INDEX: 26.16 KG/M2 | HEART RATE: 80 BPM | OXYGEN SATURATION: 95 %

## 2024-12-05 DIAGNOSIS — K64.5 THROMBOSED HEMORRHOIDS: ICD-10-CM

## 2024-12-05 PROCEDURE — 700101 HCHG RX REV CODE 250: Performed by: EMERGENCY MEDICINE

## 2024-12-05 PROCEDURE — 46083 INC THROMBOSED HROID XTRNL: CPT

## 2024-12-05 PROCEDURE — 99282 EMERGENCY DEPT VISIT SF MDM: CPT

## 2024-12-05 RX ORDER — DIBUCAINE 1 G/100G
1 OINTMENT TOPICAL 3 TIMES DAILY
Qty: 28 G | Refills: 0 | Status: SHIPPED | OUTPATIENT
Start: 2024-12-05

## 2024-12-05 RX ORDER — HYDROCORTISONE ACETATE 25 MG/1
25 SUPPOSITORY RECTAL EVERY 12 HOURS
Qty: 12 SUPPOSITORY | Refills: 0 | Status: SHIPPED | OUTPATIENT
Start: 2024-12-05

## 2024-12-05 RX ORDER — KETOROLAC TROMETHAMINE 10 MG/1
10 TABLET, FILM COATED ORAL EVERY 6 HOURS PRN
Qty: 20 TABLET | Refills: 0 | Status: SHIPPED | OUTPATIENT
Start: 2024-12-05 | End: 2024-12-10

## 2024-12-05 RX ORDER — LIDOCAINE HYDROCHLORIDE AND EPINEPHRINE BITARTRATE 20; .01 MG/ML; MG/ML
10 INJECTION, SOLUTION SUBCUTANEOUS ONCE
Status: COMPLETED | OUTPATIENT
Start: 2024-12-05 | End: 2024-12-05

## 2024-12-05 RX ORDER — AMOXICILLIN 250 MG
1 CAPSULE ORAL DAILY
Qty: 30 TABLET | Refills: 0 | Status: SHIPPED | OUTPATIENT
Start: 2024-12-05

## 2024-12-05 RX ADMIN — LIDOCAINE HYDROCHLORIDE,EPINEPHRINE BITARTRATE 10 ML: 20; .01 INJECTION, SOLUTION INFILTRATION; PERINEURAL at 09:31

## 2024-12-05 ASSESSMENT — FIBROSIS 4 INDEX: FIB4 SCORE: 0.46

## 2024-12-05 NOTE — ED PROVIDER NOTES
"ED Provider Note    CHIEF COMPLAINT  Chief Complaint   Patient presents with    Abscess     \"At 11 o'clock\" to anus. Denies fevers/chills.       EXTERNAL RECORDS REVIEWED  External ED Note prior note 8 [facility], patient seen in 2013 for hemorrhoids    HPI/ROS      Gadiel Edgar is a 44 y.o. male who presents with concern of abscess on his rectum. Pt with hx of preirectal abscess in the past which was treated approximately 4 years ago. He reports this was treated with incision and drainage.  He did well and he has not had any symptoms now for 4 years.  Yesterday started developing a similar pain in his rectum.  Worse when he defecates.  He denies any exudate or blood upon defecation.  Patient denies any fevers or chills.  He denies any trauma.  Patient denies any associate abdominal pain.    PAST MEDICAL HISTORY   has a past medical history of Patient denies medical problems.    SURGICAL HISTORY  patient denies any surgical history    FAMILY HISTORY  No family history on file.    SOCIAL HISTORY  Social History     Tobacco Use    Smoking status: Every Day     Current packs/day: 0.25     Types: Cigars, Cigarettes    Smokeless tobacco: Former     Types: Chew    Tobacco comments:     since 2006    Vaping Use    Vaping status: Never Used   Substance and Sexual Activity    Alcohol use: Yes     Comment: Rare    Drug use: Not Currently     Types: Marijuana     Comment: occ    Sexual activity: Not on file       CURRENT MEDICATIONS  Home Medications       Reviewed by Keke Perkins R.N. (Registered Nurse) on 12/05/24 at 0909  Med List Status: Not Addressed     Medication Last Dose Status   albuterol 108 (90 Base) MCG/ACT Aero Soln inhalation aerosol  Active   fluticasone (FLONASE) 50 MCG/ACT nasal spray  Active   sildenafil citrate (VIAGRA) 50 MG tablet  Active                    ALLERGIES  No Known Allergies    PHYSICAL EXAM  VITAL SIGNS: /85   Pulse 76   Temp 36.2 °C (97.2 °F) " "(Temporal)   Resp 18   Ht 1.753 m (5' 9\")   Wt 80.1 kg (176 lb 9.4 oz)   SpO2 95%   BMI 26.08 kg/m²    Physical Exam  Constitutional:       Appearance: Normal appearance.   HENT:      Mouth/Throat:      Mouth: Mucous membranes are moist.   Pulmonary:      Effort: Pulmonary effort is normal.   Genitourinary:     Comments: Bed approximately 9:00 there is a less than 1 cm area of focal fluctuance and tenderness with overlying violaceous skin changes of the rectum.  On digital rectal exam there is no significant pain or proportion or evidence of fluctuance, or mass intrarectally.   Neurological:      General: No focal deficit present.      Mental Status: He is alert and oriented to person, place, and time.   Psychiatric:         Mood and Affect: Mood normal.             Thrombosed hemorrhoid lesion    Indication: Abscess v thrombosed hemorroid    Procedure: The patient was positioned appropriately and the skin over the incision site was prepped with betadine and draped in a sterile fashion. Local anesthesia was obtained by infiltration using 1% Lidocaine with epinephrine.  An elliptical incision was then made over the apex of the lesion and a small clot was then manually expressed.      The patient tolerated the procedure well.    Complications: None      COURSE & MEDICAL DECISION MAKING    ASSESSMENT, COURSE AND PLAN  Care Narrative: Patient here with very small thrombosed hemorrhoid versus uncomplicated developing perianal abscess.  He is very well-appearing otherwise.  There is no evidence of tracking more proximally to suggest a deep space infection.  He is very well-appearing.  Vitals are reassuring.  Afebrile.  At this point my suspicion of tracking abscess remains so low I do not believe that CT is currently indicated.  Will perform bedside drainage.  Bedside drainage confirms this is more consistent with a thrombosed hemorrhoid.  Given patient's history of prior perianal abscesses I will prophylactically " cover with Augmentin.  Patient also home with Gabriela Lewis, Anusol, docusate and Toradol as needed for pain.            DISPOSITION AND DISCUSSIONS      Escalation of care considered, and ultimately not performed:diagnostic imaging was deferred, given my very low clinical suspicion of deep extension of proximal extension of infection.  See above for further discussion reasoning      FINAL DIAGNOSIS  1. Thrombosed hemorrhoids

## 2024-12-05 NOTE — ED TRIAGE NOTES
"Chief Complaint   Patient presents with    Abscess     \"At 11 o'clock\" to anus. Denies fevers/chills.     Physical Exam  Pulmonary:      Effort: Pulmonary effort is normal.   Skin:     General: Skin is warm and dry.   Neurological:      Mental Status: He is alert.       /85   Pulse 76   Temp 36.2 °C (97.2 °F) (Temporal)   Resp 18   Ht 1.753 m (5' 9\")   Wt 80.1 kg (176 lb 9.4 oz)   SpO2 95%   BMI 26.08 kg/m²     "

## 2024-12-05 NOTE — DISCHARGE INSTRUCTIONS
You have a thrombosed hemorrhoid.  Since there is a questionable associated infection and started antibiotics.  Would like you to  some probiotics while you take this.  Take the stool softener.  Before you abdominal that you can apply some shaving cream around your rectum which will make wiping much easier.  Use the Anusol, and tibial cane as well.  You can use Toradol as needed for pain.  Sometimes Toradol is not covered by insurance companies but can get up for around $5-$10 if you ask the pharmacy to use GoodRx.  Symptoms are not improving in the next 3 days please return to the emergency department.  If your symptoms worsen, you develop a fever or have any other concerns return to the emergency department

## 2024-12-11 ENCOUNTER — OFFICE VISIT (OUTPATIENT)
Dept: MEDICAL GROUP | Facility: LAB | Age: 44
End: 2024-12-11
Payer: COMMERCIAL

## 2024-12-11 VITALS
DIASTOLIC BLOOD PRESSURE: 80 MMHG | TEMPERATURE: 99.5 F | HEIGHT: 69 IN | RESPIRATION RATE: 16 BRPM | BODY MASS INDEX: 25.18 KG/M2 | SYSTOLIC BLOOD PRESSURE: 122 MMHG | HEART RATE: 76 BPM | OXYGEN SATURATION: 99 % | WEIGHT: 170 LBS

## 2024-12-11 DIAGNOSIS — Z72.0 TOBACCO ABUSE: ICD-10-CM

## 2024-12-11 DIAGNOSIS — Z13.6 SCREENING FOR CARDIOVASCULAR CONDITION: ICD-10-CM

## 2024-12-11 DIAGNOSIS — G89.29 CHRONIC RIGHT SHOULDER PAIN: ICD-10-CM

## 2024-12-11 DIAGNOSIS — K64.9 HEMORRHOIDS, UNSPECIFIED HEMORRHOID TYPE: ICD-10-CM

## 2024-12-11 DIAGNOSIS — Z11.4 SCREENING FOR HIV (HUMAN IMMUNODEFICIENCY VIRUS): ICD-10-CM

## 2024-12-11 DIAGNOSIS — Z00.00 ANNUAL PHYSICAL EXAM: ICD-10-CM

## 2024-12-11 DIAGNOSIS — Z11.59 NEED FOR HEPATITIS C SCREENING TEST: ICD-10-CM

## 2024-12-11 DIAGNOSIS — M25.561 CHRONIC PAIN OF RIGHT KNEE: ICD-10-CM

## 2024-12-11 DIAGNOSIS — G89.29 CHRONIC PAIN OF RIGHT KNEE: ICD-10-CM

## 2024-12-11 DIAGNOSIS — N52.9 ERECTILE DYSFUNCTION, UNSPECIFIED ERECTILE DYSFUNCTION TYPE: ICD-10-CM

## 2024-12-11 DIAGNOSIS — M25.511 CHRONIC RIGHT SHOULDER PAIN: ICD-10-CM

## 2024-12-11 PROCEDURE — 3079F DIAST BP 80-89 MM HG: CPT | Performed by: FAMILY MEDICINE

## 2024-12-11 PROCEDURE — 3074F SYST BP LT 130 MM HG: CPT | Performed by: FAMILY MEDICINE

## 2024-12-11 PROCEDURE — 99396 PREV VISIT EST AGE 40-64: CPT | Performed by: FAMILY MEDICINE

## 2024-12-11 RX ORDER — SILDENAFIL 50 MG/1
50 TABLET, FILM COATED ORAL
Qty: 90 TABLET | Refills: 3 | Status: SHIPPED | OUTPATIENT
Start: 2024-12-11

## 2024-12-11 ASSESSMENT — ENCOUNTER SYMPTOMS
PALPITATIONS: 0
ABDOMINAL PAIN: 0
WHEEZING: 0
SHORTNESS OF BREATH: 0

## 2024-12-11 ASSESSMENT — FIBROSIS 4 INDEX: FIB4 SCORE: 0.46

## 2024-12-11 NOTE — PROGRESS NOTES
Verbal consent was acquired by the patient to use [employer] ambient listening note generation during this visit Yes    Subjective:   Gadiel Edgar is a 44 y.o. male here today for   No chief complaint on file.    History of Present Illness  The patient is a 44-year-old male who presents today for an annual physical examination. He has a history of depression, hyperlipidemia, and migraines. He was recently seen in the emergency room on 12/05/2024, experiencing symptoms with thrombosed hemorrhoids. During hospitalization, he was treated with a stool softener, Anusol, and Augmentin due to a prior history of perianal abscesses.    He reports overall good health, with no chronic medication use except for Viagra. He has initiated a weightlifting regimen and maintains abstinence from alcohol. He does not experience chest pain, shortness of breath, difficulty breathing, or heart palpitations. He also reports no abdominal pain.    He recounts an incident on Wednesday where he noticed a bump on his buttock after showering post-work. The following day, he sought emergency care and was diagnosed with a thrombosed hemorrhoid, which was subsequently drained. He experiences intermittent pain but reports no worsening of symptoms. He expresses concern about recurrent bumps, attributing them to inadequate hydration, excessive straining, or improper lifting techniques. He admits to consuming sugary coffee in the morning and expresses interest in Pedialyte as a potential hydration solution. He has been managing the condition with over-the-counter cream and antibiotics, having declined prescribed pain medication and stool softeners.    He has a history of smoking, currently at a rate of 6 to 7 cigarettes per day, and has previously attempted cessation with Chantix, nicotine patches, and gum, but discontinued due to side effects. He also tried hypnosis, which resulted in a temporary cessation of one week. He expresses a desire  to quit smoking but acknowledges a lack of willpower.    He reports a sensation of fluid in his neck, accompanied by tingling, which has since resolved. He also reports a popping sensation in his head when massaging it. He has been sleeping without a pillow, which he reports has improved his condition. He experiences a popping sound when raising his arm, which he suspects may be related to his rotator cuff.    He has been using a brace for his knee, which occasionally feels hyperextended. He reports a sensation of instability in his knee, which locks up after prolonged sitting. He does not experience pain when the knee is bent for extended periods but notes a feeling of looseness when walking or kicking out. He has not previously engaged in physical therapy.    Supplemental Information  He is getting a mold for his teeth from his dentist because he grinds his teeth at night.    SOCIAL HISTORY  He does not drink alcohol. He admits to smoking 6 to 7 cigarettes a day.    MEDICATIONS  Current: Viagra, Augmentin, Anusol      No Known Allergies      Current medicines (including changes today)  Current Outpatient Medications   Medication Sig Dispense Refill    dibucaine (PERIANAL) 1 % Ointment Apply 1 Application topically 3 times a day. 28 g 0    hydrocortisone (ANUSOL-HC) 25 MG Suppos Insert 1 Suppository into the rectum every 12 hours. 12 Suppository 0    senna-docusate (PERICOLACE OR SENOKOT S) 8.6-50 MG Tab Take 1 Tablet by mouth every day. 30 Tablet 0    albuterol 108 (90 Base) MCG/ACT Aero Soln inhalation aerosol Inhale 2 Puffs every 6 hours as needed for Shortness of Breath. 8.5 g 0    sildenafil citrate (VIAGRA) 50 MG tablet Take 1 Tablet by mouth 1 time a day as needed for Erectile Dysfunction. 90 Tablet 0    fluticasone (FLONASE) 50 MCG/ACT nasal spray Spray 1 Spray in nose every day. 16 g 0    amoxicillin-clavulanate (AUGMENTIN) 875-125 MG Tab Take 1 Tablet by mouth 2 times a day for 7 days. 14 Tablet 0     No  "current facility-administered medications for this visit.     He  has a past medical history of Patient denies medical problems.    ROS   Review of Systems   Respiratory:  Negative for shortness of breath and wheezing.    Cardiovascular:  Negative for chest pain and palpitations.   Gastrointestinal:  Negative for abdominal pain.     -See HPI     Objective:     Physical Exam:  /80   Pulse 76   Temp 37.5 °C (99.5 °F) (Temporal)   Resp 16   Ht 1.753 m (5' 9.02\")   Wt 77.1 kg (170 lb)   SpO2 99%  Body mass index is 25.09 kg/m².   Constitutional: Alert, no distress.  Skin: Warm, dry, good turgor, no rashes in visible areas.  Eye: Equal, round and reactive, conjunctiva clear, lids normal.  ENMT: TM's clear bilaterally, lips without lesions, good dentition, oropharynx clear.  Neck: Trachea midline, no masses, no thyromegaly. No cervical or supraclavicular lymphadenopathy.  Respiratory: Unlabored respiratory effort, lungs clear to auscultation, no wheezes, no rhonchi.  Cardiovascular: Normal S1, S2, no murmur, no edema.  Abdomen: Soft, non-tender, no masses, no hepatosplenomegaly.  Psych: Alert and oriented x3, normal affect and mood.    Results      Assessment and Plan:     Assessment & Plan  1. Annual physical examination.  He is up to date on his vaccinations. A prescription refill for Viagra has been provided. Fasting blood work has been ordered to assess cholesterol levels, HIV, and hepatitis C.  -All questions concerns were answered at this time.  -Discussed the importance of a healthy, Mediterranean style diet, routine exercise regimen.  -Discussed general safety measures including seatbelts, helmets, avoidance of smoking, sunscreen, hydration.  -Follow-up for general physical exam on a yearly basis, sooner if needed.    2. Thrombosed hemorrhoids.  He was recently treated in the emergency room for thrombosed hemorrhoids and is currently on antibiotics and stool softeners. He reports occasional pain but is " managing with over-the-counter lidocaine cream. He is advised to increase water intake, fiber, and exercise to prevent future occurrences.    3. Smoking cessation.  He has been counseled on the use of nicotine patches and gum to aid in smoking cessation. He is advised to start with a 21 mg nicotine patch and supplement with 4 mg nicotine gum, gradually reducing the gum over two weeks. Behavioral strategies such as replacing smoking habits with healthier alternatives have been discussed.    4. Shoulder pain.  The shoulder pain is likely due to a rotator cuff or bursitis issue, possibly exacerbated by overuse. An x-ray of the shoulder has been ordered for further evaluation.    5. Knee pain.  The knee pain is suggestive of a meniscus injury, potentially caused by overuse or previous injury. A referral for physical therapy has been made to strengthen the musculature around the knee.    6.  Erectile dysfunction  Chronic condition, stable.  Continue treatment with Viagra 50 mg daily as needed.    Orders:  1. Annual physical exam  - CBC WITHOUT DIFFERENTIAL; Future  - Comp Metabolic Panel; Future    2. Hemorrhoids, unspecified hemorrhoid type    3. Erectile dysfunction, unspecified erectile dysfunction type  - sildenafil citrate (VIAGRA) 50 MG tablet; Take 1 Tablet by mouth 1 time a day as needed for Erectile Dysfunction.  Dispense: 90 Tablet; Refill: 3    4. Chronic right shoulder pain  - DX-SHOULDER 2+ RIGHT; Future    5. Chronic pain of right knee  - Referral to Physical Therapy    6. Tobacco abuse    7. Screening for cardiovascular condition  - Lipid Profile; Future    8. Screening for HIV (human immunodeficiency virus)  - HIV AG/AB COMBO ASSAY SCREENING; Future    9. Need for hepatitis C screening test  - HEP C VIRUS ANTIBODY; Future        Followup: No follow-ups on file.         PLEASE NOTE: This dictation was created using voice recognition and [employer] ambient listening software. I have made every reasonable  attempt to correct obvious errors, but I expect that there are errors of grammar and possibly content that I did not discover before finalizing the note.

## 2024-12-13 ENCOUNTER — APPOINTMENT (OUTPATIENT)
Dept: RADIOLOGY | Facility: MEDICAL CENTER | Age: 44
End: 2024-12-13
Attending: FAMILY MEDICINE
Payer: COMMERCIAL

## 2024-12-13 DIAGNOSIS — G89.29 CHRONIC RIGHT SHOULDER PAIN: ICD-10-CM

## 2024-12-13 DIAGNOSIS — M25.511 CHRONIC RIGHT SHOULDER PAIN: ICD-10-CM

## 2024-12-13 PROCEDURE — 73030 X-RAY EXAM OF SHOULDER: CPT | Mod: RT

## 2024-12-14 ENCOUNTER — HOSPITAL ENCOUNTER (OUTPATIENT)
Dept: LAB | Facility: MEDICAL CENTER | Age: 44
End: 2024-12-14
Attending: FAMILY MEDICINE
Payer: COMMERCIAL

## 2024-12-14 DIAGNOSIS — Z00.00 ANNUAL PHYSICAL EXAM: ICD-10-CM

## 2024-12-14 DIAGNOSIS — Z11.4 SCREENING FOR HIV (HUMAN IMMUNODEFICIENCY VIRUS): ICD-10-CM

## 2024-12-14 DIAGNOSIS — Z11.59 NEED FOR HEPATITIS C SCREENING TEST: ICD-10-CM

## 2024-12-14 DIAGNOSIS — Z13.6 SCREENING FOR CARDIOVASCULAR CONDITION: ICD-10-CM

## 2024-12-14 LAB
ALBUMIN SERPL BCP-MCNC: 4.2 G/DL (ref 3.2–4.9)
ALBUMIN/GLOB SERPL: 1.6 G/DL
ALP SERPL-CCNC: 65 U/L (ref 30–99)
ALT SERPL-CCNC: 28 U/L (ref 2–50)
ANION GAP SERPL CALC-SCNC: 8 MMOL/L (ref 7–16)
AST SERPL-CCNC: 24 U/L (ref 12–45)
BILIRUB SERPL-MCNC: 0.2 MG/DL (ref 0.1–1.5)
BUN SERPL-MCNC: 20 MG/DL (ref 8–22)
CALCIUM ALBUM COR SERPL-MCNC: 8.5 MG/DL (ref 8.5–10.5)
CALCIUM SERPL-MCNC: 8.7 MG/DL (ref 8.5–10.5)
CHLORIDE SERPL-SCNC: 106 MMOL/L (ref 96–112)
CHOLEST SERPL-MCNC: 242 MG/DL (ref 100–199)
CO2 SERPL-SCNC: 25 MMOL/L (ref 20–33)
CREAT SERPL-MCNC: 1.06 MG/DL (ref 0.5–1.4)
ERYTHROCYTE [DISTWIDTH] IN BLOOD BY AUTOMATED COUNT: 44.9 FL (ref 35.9–50)
GFR SERPLBLD CREATININE-BSD FMLA CKD-EPI: 88 ML/MIN/1.73 M 2
GLOBULIN SER CALC-MCNC: 2.6 G/DL (ref 1.9–3.5)
GLUCOSE SERPL-MCNC: 105 MG/DL (ref 65–99)
HCT VFR BLD AUTO: 49 % (ref 42–52)
HCV AB SER QL: NORMAL
HDLC SERPL-MCNC: 45 MG/DL
HGB BLD-MCNC: 16.4 G/DL (ref 14–18)
HIV 1+2 AB+HIV1 P24 AG SERPL QL IA: NORMAL
LDLC SERPL CALC-MCNC: 161 MG/DL
MCH RBC QN AUTO: 30.1 PG (ref 27–33)
MCHC RBC AUTO-ENTMCNC: 33.5 G/DL (ref 32.3–36.5)
MCV RBC AUTO: 90.1 FL (ref 81.4–97.8)
PLATELET # BLD AUTO: 368 K/UL (ref 164–446)
PMV BLD AUTO: 9.6 FL (ref 9–12.9)
POTASSIUM SERPL-SCNC: 4.4 MMOL/L (ref 3.6–5.5)
PROT SERPL-MCNC: 6.8 G/DL (ref 6–8.2)
RBC # BLD AUTO: 5.44 M/UL (ref 4.7–6.1)
SODIUM SERPL-SCNC: 139 MMOL/L (ref 135–145)
TRIGL SERPL-MCNC: 182 MG/DL (ref 0–149)
WBC # BLD AUTO: 5.8 K/UL (ref 4.8–10.8)

## 2024-12-14 PROCEDURE — 36415 COLL VENOUS BLD VENIPUNCTURE: CPT

## 2024-12-14 PROCEDURE — 86803 HEPATITIS C AB TEST: CPT

## 2024-12-14 PROCEDURE — 85027 COMPLETE CBC AUTOMATED: CPT

## 2024-12-14 PROCEDURE — 80053 COMPREHEN METABOLIC PANEL: CPT

## 2024-12-14 PROCEDURE — 87389 HIV-1 AG W/HIV-1&-2 AB AG IA: CPT

## 2024-12-14 PROCEDURE — 80061 LIPID PANEL: CPT

## 2025-02-10 ENCOUNTER — OFFICE VISIT (OUTPATIENT)
Dept: MEDICAL GROUP | Facility: LAB | Age: 45
End: 2025-02-10
Payer: COMMERCIAL

## 2025-02-10 VITALS
HEART RATE: 74 BPM | RESPIRATION RATE: 16 BRPM | WEIGHT: 170 LBS | OXYGEN SATURATION: 98 % | TEMPERATURE: 98.4 F | BODY MASS INDEX: 25.18 KG/M2 | SYSTOLIC BLOOD PRESSURE: 110 MMHG | HEIGHT: 69 IN | DIASTOLIC BLOOD PRESSURE: 74 MMHG

## 2025-02-10 DIAGNOSIS — Z72.0 TOBACCO ABUSE: ICD-10-CM

## 2025-02-10 DIAGNOSIS — E78.5 DYSLIPIDEMIA: ICD-10-CM

## 2025-02-10 DIAGNOSIS — Q82.8 ACCESSORY SKIN TAGS: ICD-10-CM

## 2025-02-10 DIAGNOSIS — R05.8 POST-VIRAL COUGH SYNDROME: ICD-10-CM

## 2025-02-10 PROCEDURE — 99214 OFFICE O/P EST MOD 30 MIN: CPT | Performed by: FAMILY MEDICINE

## 2025-02-10 PROCEDURE — 3078F DIAST BP <80 MM HG: CPT | Performed by: FAMILY MEDICINE

## 2025-02-10 PROCEDURE — 3074F SYST BP LT 130 MM HG: CPT | Performed by: FAMILY MEDICINE

## 2025-02-10 RX ORDER — VARENICLINE TARTRATE 0.5 (11)-1
KIT ORAL
Qty: 53 EACH | Refills: 0 | Status: SHIPPED | OUTPATIENT
Start: 2025-02-10

## 2025-02-10 RX ORDER — VARENICLINE TARTRATE 1 MG/1
1 TABLET, FILM COATED ORAL 2 TIMES DAILY
Qty: 60 TABLET | Refills: 3 | Status: SHIPPED | OUTPATIENT
Start: 2025-02-10

## 2025-02-10 RX ORDER — ROSUVASTATIN CALCIUM 20 MG/1
20 TABLET, COATED ORAL EVERY EVENING
Qty: 90 TABLET | Refills: 3 | Status: SHIPPED | OUTPATIENT
Start: 2025-02-10

## 2025-02-10 ASSESSMENT — FIBROSIS 4 INDEX: FIB4 SCORE: 0.55

## 2025-02-10 NOTE — PROGRESS NOTES
Verbal consent was acquired by the patient to use [email or web address] ambient listening note generation during this visit Yes    Subjective:   Gadiel Edgar is a 44 y.o. male here today for   Chief Complaint   Patient presents with    Lab Results    URI     History of Present Illness  The patient is a 44-year-old male who presents today to follow up on recent lab results showing hyperlipidemia. He also has concerns regarding symptoms of an upper respiratory infection (URI) and ongoing skin tags.    He has been adhering to a diet predominantly consisting of rabbit meat, with occasional indulgence in red meat. His workday diet includes apples and oranges, and he expresses a desire to increase his fiber intake. He has a significant family history of cardiovascular diseases, including heart attacks, strokes, high cholesterol, diabetes, and hypertension, particularly on his paternal side. He also reports a history of cocaine use.    He has attempted smoking cessation in the past but did not utilize Chantix. Instead, he tried hypnosis, which he found beneficial, but discontinued due to financial constraints. He continues to smoke at the same rate.    He recently experienced an illness, initially presenting as a sore throat, which progressed to a cold and subsequently affected his lungs. He did not produce any sputum during this period, only saliva. He reports that his lungs still feel congested, although he does not have any fever. He acknowledges that his smoking habit may be exacerbating his symptoms. He has a past medical history of pneumonia.    He has skin tags on his eyelids and was considering over-the-counter treatment but is apprehensive about using it on his eyelids.    SOCIAL HISTORY  The patient admits to smoking cigarettes and has a history of cocaine use.    FAMILY HISTORY  The patient reports a family history of heart attack, strokes, high cholesterol, diabetes, and high blood pressure, especially on his  "father's side.      No Known Allergies      Current medicines (including changes today)  Current Outpatient Medications   Medication Sig Dispense Refill    sildenafil citrate (VIAGRA) 50 MG tablet Take 1 Tablet by mouth 1 time a day as needed for Erectile Dysfunction. 90 Tablet 3    dibucaine (PERIANAL) 1 % Ointment Apply 1 Application topically 3 times a day. 28 g 0    hydrocortisone (ANUSOL-HC) 25 MG Suppos Insert 1 Suppository into the rectum every 12 hours. 12 Suppository 0    senna-docusate (PERICOLACE OR SENOKOT S) 8.6-50 MG Tab Take 1 Tablet by mouth every day. 30 Tablet 0    albuterol 108 (90 Base) MCG/ACT Aero Soln inhalation aerosol Inhale 2 Puffs every 6 hours as needed for Shortness of Breath. 8.5 g 0    fluticasone (FLONASE) 50 MCG/ACT nasal spray Spray 1 Spray in nose every day. 16 g 0     No current facility-administered medications for this visit.     He  has a past medical history of Patient denies medical problems.    ROS   ROS  -See HPI     Objective:     Physical Exam:  /74   Pulse 74   Temp 36.9 °C (98.4 °F) (Temporal)   Resp 16   Ht 1.753 m (5' 9.02\")   Wt 77.1 kg (170 lb)   SpO2 98%  Body mass index is 25.09 kg/m².   Constitutional: Alert, no distress, well-groomed.  Skin: No rashes in visible areas.  Eye: Round. Conjunctiva clear, lids normal. No icterus.   ENMT: Lips pink without lesions, good dentition, moist mucous membranes. Phonation normal.  Neck: No masses, no thyromegaly. Moves freely without pain.  Respiratory: Unlabored respiratory effort, no cough or audible wheeze  Psych: Alert and oriented x3, normal affect and mood.     Results   Latest Reference Range & Units 12/14/24 07:59   Cholesterol,Tot 100 - 199 mg/dL 242 (H)   Triglycerides 0 - 149 mg/dL 182 (H)   HDL >=40 mg/dL 45   LDL <100 mg/dL 161 (H)   (H): Data is abnormally high    Assessment and Plan:     Assessment & Plan  1. Hyperlipidemia.  His non-HDL total cholesterol level is 198, indicating an elevated risk " for cardiovascular disease. Given his family history and current cholesterol levels, starting a statin is recommended. He is advised to maintain a healthy diet, engage in regular exercise, and avoid fatty, fried foods, and red meat. A prescription for rosuvastatin has been issued, to be taken daily.    2. Smoking cessation.  He is encouraged to set a quit date and persist in his efforts to quit smoking. A Chantix starter pack has been prescribed, with instructions to take 1 pill daily, gradually increasing the dosage to 1 mg twice daily. He can continue smoking during the loading phase. Once the dosage reaches 1 mg twice daily, he should cease smoking. He will remain on Chantix 1 mg twice daily for 3 to 6 months. Concurrent use of nicotine patches, gum, or lozenges is permitted.    3. Post-viral cough.  His lung sounds are normal, suggesting a post-viral cough that should resolve over time. If he experiences fevers, increased shortness of breath, or difficulty breathing, he should inform the clinic immediately.    4. Skin tags.  A referral to an ophthalmologist has been made for further evaluation of the skin tags on his eyelids.    Follow-up  The patient will follow up in 3 months.    Orders:  1. Dyslipidemia  - rosuvastatin (CRESTOR) 20 MG Tab; Take 1 Tablet by mouth every evening.  Dispense: 90 Tablet; Refill: 3    2. Accessory skin tags  - Referral to Ophthalmology    3. Tobacco abuse  - Varenicline Tartrate, Starter, 0.5 MG X 11 & 1 MG X 42 Tablet Therapy Pack; Follow directions on package  Dispense: 53 Each; Refill: 0  - varenicline (CHANTIX) 1 MG tablet; Take 1 Tablet by mouth 2 times a day.  Dispense: 60 Tablet; Refill: 3    4. Post-viral cough syndrome        Followup: No follow-ups on file.         PLEASE NOTE: This dictation was created using voice recognition and [employer] ambient listening software. I have made every reasonable attempt to correct obvious errors, but I expect that there are errors of  grammar and possibly content that I did not discover before finalizing the note.

## 2025-02-13 NOTE — Clinical Note
REFERRAL APPROVAL NOTICE         Sent on February 13, 2025                   Gadiel Jacksonvier Herve  [address] [place] [address]                   Dear Mr. Edgar,    After a careful review of the medical information and benefit coverage, [facility] has processed your referral. See below for additional details.    If applicable, you must be actively enrolled with your insurance for coverage of the authorized service. If you have any questions regarding your coverage, please contact your insurance directly.    REFERRAL INFORMATION   Referral #:  [id number]  Referred-To Provider    Referred-By Provider:  Ophthalmology    LAUREN Sanchez MARCUS J Y      [address]  [place] [address]  [phone] [address]  [place] [id number] [phone]    Referral Start Date:  02/10/2025  Referral End Date:   02/10/2026             SCHEDULING  If you do not already have an appointment, please call [phone] to make an appointment.     MORE INFORMATION  If you do not already have a [employer] account, sign up at: [email or web address]  You can access your medical information, make appointments, see lab results, billing information, and more.  If you have questions regarding this referral, please contact  the [facility] Referrals department at:             [phone]. Monday - Friday 8:00AM - 5:00PM.     Sincerely,    [facility]

## 2025-02-15 ENCOUNTER — APPOINTMENT (OUTPATIENT)
Dept: RADIOLOGY | Facility: MEDICAL CENTER | Age: 45
End: 2025-02-15
Attending: EMERGENCY MEDICINE
Payer: COMMERCIAL

## 2025-02-15 ENCOUNTER — HOSPITAL ENCOUNTER (EMERGENCY)
Facility: MEDICAL CENTER | Age: 45
End: 2025-02-15
Attending: EMERGENCY MEDICINE
Payer: COMMERCIAL

## 2025-02-15 VITALS
HEIGHT: 69 IN | SYSTOLIC BLOOD PRESSURE: 156 MMHG | BODY MASS INDEX: 25.99 KG/M2 | DIASTOLIC BLOOD PRESSURE: 95 MMHG | OXYGEN SATURATION: 98 % | RESPIRATION RATE: 18 BRPM | HEART RATE: 61 BPM | TEMPERATURE: 97.7 F | WEIGHT: 175.49 LBS

## 2025-02-15 DIAGNOSIS — M54.12 CERVICAL RADICULOPATHY: ICD-10-CM

## 2025-02-15 DIAGNOSIS — R07.9 ACUTE CHEST PAIN: ICD-10-CM

## 2025-02-15 DIAGNOSIS — Z71.6 ENCOUNTER FOR SMOKING CESSATION COUNSELING: ICD-10-CM

## 2025-02-15 DIAGNOSIS — E83.51 HYPOCALCEMIA: ICD-10-CM

## 2025-02-15 LAB
ALBUMIN SERPL BCP-MCNC: 4.3 G/DL (ref 3.2–4.9)
ALBUMIN/GLOB SERPL: 1.7 G/DL
ALP SERPL-CCNC: 66 U/L (ref 30–99)
ALT SERPL-CCNC: 24 U/L (ref 2–50)
ANION GAP SERPL CALC-SCNC: 10 MMOL/L (ref 7–16)
AST SERPL-CCNC: 19 U/L (ref 12–45)
BASOPHILS # BLD AUTO: 0.2 % (ref 0–1.8)
BASOPHILS # BLD: 0.02 K/UL (ref 0–0.12)
BILIRUB SERPL-MCNC: 0.3 MG/DL (ref 0.1–1.5)
BUN SERPL-MCNC: 15 MG/DL (ref 8–22)
CALCIUM ALBUM COR SERPL-MCNC: 8.3 MG/DL (ref 8.5–10.5)
CALCIUM SERPL-MCNC: 8.5 MG/DL (ref 8.4–10.2)
CHLORIDE SERPL-SCNC: 102 MMOL/L (ref 96–112)
CO2 SERPL-SCNC: 23 MMOL/L (ref 20–33)
CREAT SERPL-MCNC: 0.81 MG/DL (ref 0.5–1.4)
EKG IMPRESSION: NORMAL
EOSINOPHIL # BLD AUTO: 0.21 K/UL (ref 0–0.51)
EOSINOPHIL NFR BLD: 2.4 % (ref 0–6.9)
ERYTHROCYTE [DISTWIDTH] IN BLOOD BY AUTOMATED COUNT: 40.2 FL (ref 35.9–50)
GFR SERPLBLD CREATININE-BSD FMLA CKD-EPI: 110 ML/MIN/1.73 M 2
GLOBULIN SER CALC-MCNC: 2.5 G/DL (ref 1.9–3.5)
GLUCOSE SERPL-MCNC: 82 MG/DL (ref 65–99)
HCT VFR BLD AUTO: 44.2 % (ref 42–52)
HGB BLD-MCNC: 15.4 G/DL (ref 14–18)
IMM GRANULOCYTES # BLD AUTO: 0.03 K/UL (ref 0–0.11)
IMM GRANULOCYTES NFR BLD AUTO: 0.3 % (ref 0–0.9)
LIPASE SERPL-CCNC: 33 U/L (ref 11–82)
LYMPHOCYTES # BLD AUTO: 2.38 K/UL (ref 1–4.8)
LYMPHOCYTES NFR BLD: 26.7 % (ref 22–41)
MCH RBC QN AUTO: 29.7 PG (ref 27–33)
MCHC RBC AUTO-ENTMCNC: 34.8 G/DL (ref 32.3–36.5)
MCV RBC AUTO: 85.3 FL (ref 81.4–97.8)
MONOCYTES # BLD AUTO: 0.51 K/UL (ref 0–0.85)
MONOCYTES NFR BLD AUTO: 5.7 % (ref 0–13.4)
NEUTROPHILS # BLD AUTO: 5.75 K/UL (ref 1.82–7.42)
NEUTROPHILS NFR BLD: 64.7 % (ref 44–72)
NRBC # BLD AUTO: 0 K/UL
NRBC BLD-RTO: 0 /100 WBC (ref 0–0.2)
NT-PROBNP SERPL IA-MCNC: <36 PG/ML (ref 0–125)
PLATELET # BLD AUTO: 343 K/UL (ref 164–446)
PMV BLD AUTO: 9 FL (ref 9–12.9)
POTASSIUM SERPL-SCNC: 4.1 MMOL/L (ref 3.6–5.5)
PROT SERPL-MCNC: 6.8 G/DL (ref 6–8.2)
RBC # BLD AUTO: 5.18 M/UL (ref 4.7–6.1)
SODIUM SERPL-SCNC: 135 MMOL/L (ref 135–145)
TROPONIN T SERPL-MCNC: 8 NG/L (ref 6–19)
WBC # BLD AUTO: 8.9 K/UL (ref 4.8–10.8)

## 2025-02-15 PROCEDURE — 84484 ASSAY OF TROPONIN QUANT: CPT

## 2025-02-15 PROCEDURE — 36415 COLL VENOUS BLD VENIPUNCTURE: CPT

## 2025-02-15 PROCEDURE — 70551 MRI BRAIN STEM W/O DYE: CPT

## 2025-02-15 PROCEDURE — 700102 HCHG RX REV CODE 250 W/ 637 OVERRIDE(OP): Performed by: EMERGENCY MEDICINE

## 2025-02-15 PROCEDURE — 83690 ASSAY OF LIPASE: CPT

## 2025-02-15 PROCEDURE — 99284 EMERGENCY DEPT VISIT MOD MDM: CPT

## 2025-02-15 PROCEDURE — 80053 COMPREHEN METABOLIC PANEL: CPT

## 2025-02-15 PROCEDURE — 93005 ELECTROCARDIOGRAM TRACING: CPT | Mod: TC | Performed by: EMERGENCY MEDICINE

## 2025-02-15 PROCEDURE — 85025 COMPLETE CBC W/AUTO DIFF WBC: CPT

## 2025-02-15 PROCEDURE — 83880 ASSAY OF NATRIURETIC PEPTIDE: CPT

## 2025-02-15 PROCEDURE — 93005 ELECTROCARDIOGRAM TRACING: CPT | Mod: TC

## 2025-02-15 PROCEDURE — 71275 CT ANGIOGRAPHY CHEST: CPT

## 2025-02-15 PROCEDURE — A9270 NON-COVERED ITEM OR SERVICE: HCPCS | Performed by: EMERGENCY MEDICINE

## 2025-02-15 PROCEDURE — 700117 HCHG RX CONTRAST REV CODE 255: Performed by: EMERGENCY MEDICINE

## 2025-02-15 RX ORDER — CALCIUM CARBONATE 500 MG/1
500 TABLET, CHEWABLE ORAL ONCE
Status: COMPLETED | OUTPATIENT
Start: 2025-02-15 | End: 2025-02-15

## 2025-02-15 RX ADMIN — CALCIUM CARBONATE (ANTACID) CHEW TAB 500 MG 500 MG: 500 CHEW TAB at 19:33

## 2025-02-15 RX ADMIN — IOHEXOL 75 ML: 350 INJECTION, SOLUTION INTRAVENOUS at 17:43

## 2025-02-15 ASSESSMENT — FIBROSIS 4 INDEX: FIB4 SCORE: 0.55

## 2025-02-16 NOTE — DISCHARGE INSTRUCTIONS
Today we looked at your brain and see have no abnormalities other than chronic changes.  Additionally we looked at your chest.  We evaluated it with multiple modalities and thankfully everything appears normal at this time to us.  You had low calcium and we provided you with a medication here.  It is our recommendation that you follow-up with the primary care to discuss your recent emergency department visit.  You could consider discussing with them that left arm pain and the possibility that an MRI of your cervical spine might be of needed.  We recommend you stop smoking.  Return to the emergency department as needed.

## 2025-02-16 NOTE — ED NOTES
Report from SAHARA Crockett   Rounded on pt. POC explained to pt, denies needs or complaints at this time. Call light in reach.

## 2025-02-16 NOTE — ED PROVIDER NOTES
"ER Provider Note    Scribed for  Reginald Prater D.O. by Prasanth Gaffney. 2/15/2025   5:00 PM    Primary Care Provider: Kavon Mcfadden M.D.    CHIEF COMPLAINT  Chief Complaint   Patient presents with    Chest Pain     Started today L arm started feeling heavy and then chest started being discomfort \"Like someone was pushing on it\"  CP is gone however L arm continues to feel weird      EXTERNAL RECORDS REVIEWED  Outpatient Notes The patient was seen on 2/10/25 for follow up on lab results showing hyperlipidemia. The patient was placed on Rosuvastatin Calcium at this time.      HPI/ROS  LIMITATION TO HISTORY   Select: : None  OUTSIDE HISTORIAN(S):  None    Gadiel Edgar is a 44 y.o. male who presents to the ED for evaluation of chest discomfort. The patient explains while at work he started to feel heaviness to his left arm, that then radiated to his chest region. He describes it as if \"someone was pushing on it\" and notes some slight numbness/tingling. At bedside the patient reports his chest discomfort is now resolved but continues to feel heaviness to his left arm. Concern of symptoms the patient presents to the ED for further evaluation. The patient does note to have been recently placed on new cholesterol medication by his PCP. He is a current tobacco smoker.     PAST MEDICAL HISTORY  Past Medical History:   Diagnosis Date    Patient denies medical problems        SURGICAL HISTORY  History reviewed. No pertinent surgical history.    FAMILY HISTORY  Family History   Problem Relation Age of Onset    Hypertension Mother     Hyperlipidemia Mother     Hypertension Father     Hyperlipidemia Father        SOCIAL HISTORY   reports that he has been smoking cigars and cigarettes. He has quit using smokeless tobacco.  His smokeless tobacco use included chew. He reports current alcohol use. He reports that he does not currently use drugs after having used the following drugs: Marijuana.    CURRENT " "MEDICATIONS  Discharge Medication List as of 2/15/2025  7:29 PM        CONTINUE these medications which have NOT CHANGED    Details   rosuvastatin (CRESTOR) 20 MG Tab Take 1 Tablet by mouth every evening., Disp-90 Tablet, R-3, Normal      Varenicline Tartrate, Starter, 0.5 MG X 11 & 1 MG X 42 Tablet Therapy Pack Follow directions on package, Disp-53 Each, R-0, Normal      varenicline (CHANTIX) 1 MG tablet Take 1 Tablet by mouth 2 times a day., Disp-60 Tablet, R-3, Normal      sildenafil citrate (VIAGRA) 50 MG tablet Take 1 Tablet by mouth 1 time a day as needed for Erectile Dysfunction.Patient is using GoodRx for scriptDisp-90 Tablet, R-3, Normal      dibucaine (PERIANAL) 1 % Ointment Apply 1 Application topically 3 times a day., Disp-28 g, R-0, Normal      hydrocortisone (ANUSOL-HC) 25 MG Suppos Insert 1 Suppository into the rectum every 12 hours., Disp-12 Suppository, R-0, Normal      senna-docusate (PERICOLACE OR SENOKOT S) 8.6-50 MG Tab Take 1 Tablet by mouth every day., Disp-30 Tablet, R-0, Normal      albuterol 108 (90 Base) MCG/ACT Aero Soln inhalation aerosol Inhale 2 Puffs every 6 hours as needed for Shortness of Breath., Disp-8.5 g, R-0, Normal             ALLERGIES  No Known Allergies     PHYSICAL EXAM  /84   Pulse 65   Temp 36.5 °C (97.7 °F) (Temporal)   Resp 18   Ht 1.753 m (5' 9\")   Wt 79.6 kg (175 lb 7.8 oz)   SpO2 95%   BMI 25.91 kg/m²    General: No acute distress  Neuro: GCS 15, awake alert and oriented x 4, cranial nerves II through XII gross intact muscle strength 5 out of 5 in all 4 extremity sensation coordination gait within normal limits.  Neck: Supple, full range of motion without pain, no spinal tenderness.  Cardiac: Regular rate and rhythm  Pulmonary: Clear to auscultation bilaterally no distress  Abdomen: Soft nontender nondistended  Back: Nontender  Psych: Normal  Skin: Pink warm dry  Extremities: Full range of motion, muscle strength sensation intact 2+ pulses.  Full range " of motion left arm, circulation motor and sensation intact compartments soft, no warmth, no rash.    DIAGNOSTIC STUDIES/PROCEDURES  Labs:   Labs Reviewed   COMP METABOLIC PANEL - Abnormal; Notable for the following components:       Result Value    Correct Calcium 8.3 (*)     All other components within normal limits   CBC WITH DIFFERENTIAL   LIPASE   TROPONIN   PROBRAIN NATRIURETIC PEPTIDE, NT   ESTIMATED GFR     I have independently interpreted the above labs    EKG:   Results for orders placed or performed during the hospital encounter of 02/15/25   EKG   Result Value Ref Range    Report       [facility] Emergency Dept.    Test Date:  2025-02-15  Pt Name:    ISABELLA KIMBLE               Department: [facility]  MRN:        [id number]                      [facility]  Gender:     Male                         Technician: [id number]  :        1980                   Requested By:ER TRIAGE PROTOCOL  Order #:    [id number]                    Reading MD: Reginald Prater    Measurements  Intervals                                Axis  Rate:       63                           P:          47  NV:         181                          QRS:        71  QRSD:       110                          T:          66  QT:         360  QTc:        369    Interpretive Statements  Sinus rhythm  Compared to ECG 2017 16:42:33  ST (T wave) deviation no longer present  Electronically Signed On 02- 17:01:54 PST by Reginald Prater       I have independently interpreted this EKG    Radiology:   This attending emergency physician has independently interpreted the diagnostic imaging associated with this visit and is awaiting the final reading from the radiologist.   Preliminary interpretation is a follows: No acute findings  Radiologist interpretation:   MR-BRAIN-W/O   Final Result      1.  Minimal supratentorial white matter disease most consistent with microvascular ischemic change versus demyelination or  gliosis.   2.  No evidence of acute infarction, hemorrhage, or mass lesion.      CT-CTA CHEST WITH & W/O-POST PROCESS   Final Result      1.  No thoracic aortic aneurysm or dissection.   2.  Subclavian arteries are unremarkable.   3.  No acute cardiopulmonary disease.                 COURSE & MEDICAL DECISION MAKING     CHEST PAIN:   HEART Score for Major Cardiac Events  HEART Score   Low Score, risk of MACE 0.9-1.7%.      INITIAL ASSESSMENT, COURSE AND PLAN  Differential diagnoses include but not limited to: CVA, Aortic dissection, cervical radiculopathy.     Care Narrative: 44-year-old presents complaining of chest pain which has resolved as well as left upper extremity heaviness.    5:00 PM - Patient was first seen and evaluated at bedside. Patient presents to the ED for chest discomfort with associated left arm heaviness. Ordered for EKG, CT-CTA Chest, MR-brain, CBC w/diff, CMP, Lipase, and Pro-brain natriuretic to evaluate. The patient will be medicated with Tums 500 mg for his symptoms. Patient verbalizes understanding and support with my plan of care.     7:38 PM- Patient was reevaluated at bedside. Discussed lab and radiology results with the patient and informed them reassuring results. The patient will be discharged and should return if symptoms worsen or if new symptoms arise. The patient understands and agrees to plan.      Smoking cessation counselin minutes including discussion of various products available to assist with this endeavor including nicoderm patch, gum, chantix, wellbutrin, and etc.  Discussion of triggers to smoke and reasons to quit.  Discussed stepped-down approach and support groups available to assist with smoking cessation.      ED COURSE AND ADDITIONAL PROBLEMS    Acute chest pain: EKG benign.  Troponin negative.  CT angiogram of the chest to rule out aortic dissection, negative for acute findings.  Chest pain resolved.  Low heart score.  Patient does understand that informed  discharge does come with some small risk of major adverse cardiac event need for intervention heart attack prolonged disability and/or death.  He is can follow-up with primary care in the next 48 hours or return to emergency department symptoms recur.    Cervical radiculopathy: Patient given his left arm heaviness however the NIH is 0 and the neurological exam is normal on presentation and prior to discharge.  MRI brain without negative for acute findings.  Patient agrees to discuss with primary care.    Hypocalcemia: P.o. repletion.    Counseled for smoking sedation, see above.    DISPOSITION AND DISCUSSIONS    I have discussed management of the patient with the following physicians and SUDHEER's:  None    Discussion of management with other QHP or appropriate source(s): None     Escalation of care considered, and ultimately not performed: Hospital admission.    Barriers to care at this time, including but not limited to:  None .     Decision tools and prescription drugs considered including, but not limited to: None.    The patient will return for new or worsening symptoms and is stable at the time of discharge.    The patient is referred to a primary physician for blood pressure management, diabetic screening, and for all other preventative health concerns.    DISPOSITION:  Patient will be discharged home in stable condition.    FOLLOW UP:  Kavon Mcfadden M.D.  [address] [address] [phone]    Schedule an appointment as soon as possible for a visit in 2 days      [facility], Emergency Dept  [address] [facility] [id number] [phone]    As needed, If symptoms worsen    FINAL DIAGNOSIS  1. Acute chest pain    2. Cervical radiculopathy    3. Hypocalcemia    4. Encounter for smoking cessation counseling      IPrasanth (Scribcyrus), am scribing for, and in the presence of, Reginald Prater D.O..    Electronically signed by: Prasanth Gaffney  (Scribe), 2/15/2025    Reginald CARBAJAL D.O. personally performed the services described in this documentation, as scribed by Prasanth Gaffney in my presence, and it is both accurate and complete.      The note accurately reflects work and decisions made by me.  Reginald Prater D.O.  2/15/2025  9:56 PM

## 2025-02-16 NOTE — ED TRIAGE NOTES
"/84   Pulse 65   Temp 36.5 °C (97.7 °F) (Temporal)   Resp 18   Ht 1.753 m (5' 9\")   Wt 79.6 kg (175 lb 7.8 oz)   SpO2 95%   BMI 25.91 kg/m²   Chief Complaint   Patient presents with    Chest Pain     Started today L arm started feeling heavy and then chest started being discomfort \"Like someone was pushing on it\"  CP is gone however L arm continues to feel weird      Comes in by himself  concerned about L arm and CP that suddenly came on today while at work  CP gone now however L arm still feels weird   started new medication 3 days ago  Chantix and crestor  concerned that this might be the cause per pt  EKG done in triage  "

## 2025-03-07 DIAGNOSIS — Z72.0 TOBACCO ABUSE: ICD-10-CM

## 2025-03-07 RX ORDER — VARENICLINE TARTRATE 1 MG/1
1 TABLET, FILM COATED ORAL 2 TIMES DAILY
Qty: 60 TABLET | Refills: 3 | Status: SHIPPED | OUTPATIENT
Start: 2025-03-07

## 2025-03-19 ENCOUNTER — OFFICE VISIT (OUTPATIENT)
Dept: URGENT CARE | Facility: CLINIC | Age: 45
End: 2025-03-19
Payer: COMMERCIAL

## 2025-03-19 VITALS
BODY MASS INDEX: 24.73 KG/M2 | DIASTOLIC BLOOD PRESSURE: 62 MMHG | RESPIRATION RATE: 18 BRPM | HEART RATE: 99 BPM | HEIGHT: 69 IN | SYSTOLIC BLOOD PRESSURE: 108 MMHG | WEIGHT: 167 LBS | OXYGEN SATURATION: 96 % | TEMPERATURE: 98.4 F

## 2025-03-19 DIAGNOSIS — F51.5 NIGHTMARES: ICD-10-CM

## 2025-03-19 DIAGNOSIS — M79.672 LEFT FOOT PAIN: ICD-10-CM

## 2025-03-19 DIAGNOSIS — R05.1 ACUTE COUGH: ICD-10-CM

## 2025-03-19 DIAGNOSIS — J06.9 UPPER RESPIRATORY INFECTION, ACUTE: ICD-10-CM

## 2025-03-19 PROCEDURE — 3074F SYST BP LT 130 MM HG: CPT | Performed by: NURSE PRACTITIONER

## 2025-03-19 PROCEDURE — 3078F DIAST BP <80 MM HG: CPT | Performed by: NURSE PRACTITIONER

## 2025-03-19 PROCEDURE — 99214 OFFICE O/P EST MOD 30 MIN: CPT | Performed by: NURSE PRACTITIONER

## 2025-03-19 RX ORDER — BENZONATATE 100 MG/1
100 CAPSULE ORAL 3 TIMES DAILY PRN
Qty: 60 CAPSULE | Refills: 0 | Status: SHIPPED | OUTPATIENT
Start: 2025-03-19

## 2025-03-19 ASSESSMENT — FIBROSIS 4 INDEX: FIB4 SCORE: 0.51

## 2025-03-19 NOTE — Clinical Note
Patient is having terrible nightmares on his Chantix.  It was recommended that he stop using Chantix and contact your office for a follow-up appointment.

## 2025-03-19 NOTE — PROGRESS NOTES
Gadiel Edgar is a 44 y.o. male who presents for URI (Head pressure and chest congestion  x 1 week now/Left foot bottom feels bulge, some redness and painful when he walks on it 1 day)      HPI  This is a new problem. Gadiel Edgar is a 44 y.o. patient who presents to urgent care with c/o:   1) cold symptoms for 2 weeks. Congestion in chest with head pressure. He feels like his neck lymph nodes on the left are swollen. Right ear pressure. Subjective temps. Chest congestion. Coughing keeps him awake at night. Cough cough.  Sometimes he feels a burning in his chest when he takes a deep breath. Hears wheezing from his nose at night. Hx of allergies. Denies runny nose. Taking nyquil for symptoms which helps. Takes Allegra prn . Taking Astapro otc prn   2) Bulge under his foot when he was massaging. Foot feels sore. Mildly itchy. Wearing old shoes and knows that he needs to get new shoes. It hurts  He has tried massage.   3) -he also reports that he is on Chantix for smoking cessation.  He is starting having very vivid, horrible nightmares.    ROS See HPI    Allergies:     No Known Allergies    PMSFS Hx:  Past Medical History:   Diagnosis Date    Patient denies medical problems      History reviewed. No pertinent surgical history.  Family History   Problem Relation Age of Onset    Hypertension Mother     Hyperlipidemia Mother     Hypertension Father     Hyperlipidemia Father      Social History     Tobacco Use    Smoking status: Every Day     Current packs/day: 0.25     Types: Cigars, Cigarettes    Smokeless tobacco: Former     Types: Chew    Tobacco comments:     since 2006    Substance Use Topics    Alcohol use: Yes     Comment: Rare         Problems:   Patient Active Problem List   Diagnosis    Dyslipidemia    Tobacco abuse    Ocular migraine    Mild episode of recurrent major depressive disorder (HCC)       Medications:   Current Outpatient Medications on File Prior to Visit   Medication Sig Dispense  "Refill    varenicline (CHANTIX) 1 MG tablet Take 1 Tablet by mouth 2 times a day. 60 Tablet 3    varenicline (CHANTIX) 1 MG tablet Take 1 Tablet by mouth 2 times a day. 60 Tablet 3    rosuvastatin (CRESTOR) 20 MG Tab Take 1 Tablet by mouth every evening. 90 Tablet 3    sildenafil citrate (VIAGRA) 50 MG tablet Take 1 Tablet by mouth 1 time a day as needed for Erectile Dysfunction. 90 Tablet 3    dibucaine (PERIANAL) 1 % Ointment Apply 1 Application topically 3 times a day. 28 g 0    hydrocortisone (ANUSOL-HC) 25 MG Suppos Insert 1 Suppository into the rectum every 12 hours. 12 Suppository 0    senna-docusate (PERICOLACE OR SENOKOT S) 8.6-50 MG Tab Take 1 Tablet by mouth every day. 30 Tablet 0    albuterol 108 (90 Base) MCG/ACT Aero Soln inhalation aerosol Inhale 2 Puffs every 6 hours as needed for Shortness of Breath. 8.5 g 0     No current facility-administered medications on file prior to visit.        Objective:     /62 (BP Location: Left arm, Patient Position: Sitting, BP Cuff Size: Large adult)   Pulse 99   Temp 36.9 °C (98.4 °F) (Temporal)   Resp 18   Ht 1.753 m (5' 9\")   Wt 75.8 kg (167 lb)   SpO2 96%   BMI 24.66 kg/m²     Physical Exam  Nursing note reviewed.   Constitutional:       General: He is not in acute distress.     Appearance: Normal appearance. He is well-developed and well-groomed. He is not ill-appearing or toxic-appearing.   HENT:      Right Ear: External ear normal.      Left Ear: External ear normal.      Nose: Nose normal.      Mouth/Throat:      Mouth: Mucous membranes are moist.      Pharynx: No posterior oropharyngeal erythema.   Eyes:      General:         Right eye: No discharge.         Left eye: No discharge.      Conjunctiva/sclera: Conjunctivae normal.      Pupils: Pupils are equal, round, and reactive to light.   Cardiovascular:      Rate and Rhythm: Normal rate and regular rhythm.      Pulses: Normal pulses.      Heart sounds: Normal heart sounds.   Pulmonary:      " Effort: Pulmonary effort is normal. No accessory muscle usage.      Breath sounds: Normal breath sounds and air entry.   Musculoskeletal:      Cervical back: Neck supple.      Left foot: Normal range of motion. No swelling, deformity, Charcot foot, prominent metatarsal heads or tenderness.        Feet:    Lymphadenopathy:      Cervical: No cervical adenopathy.      Upper Body:      Right upper body: No supraclavicular adenopathy.      Left upper body: No supraclavicular adenopathy.   Skin:     General: Skin is warm and dry.      Capillary Refill: Capillary refill takes less than 2 seconds.   Neurological:      Mental Status: He is alert and oriented to person, place, and time.   Psychiatric:         Mood and Affect: Mood normal.         Behavior: Behavior normal.         Assessment /Associated Orders:      1. Upper respiratory infection, acute        2. Left foot pain        3. Acute cough  benzonatate (TESSALON) 100 MG Cap      4. Nightmares              Medical Decision Making:    Gadiel Edgar is a very pleasant 44 y.o. male who is clinically stable at today's acute urgent care visit. Presents with acute problem/ concern today.    No acute distress is noted at the time of the visit.  VSS. Appropriate for outpatient care at this time.   Patient educated this is a viral illness.  Will resolve with time.  He can take over-the-counter medications as needed for symptom relief if needed.  He was given a prescription for cough suppressant.  Educated that his left foot pain is most likely related to his shoes.  He has a history of plantar fasciitis but this feels different.  There is no reproducible pain today.  He did use massage last night which seemed to help his discomfort.  There is no erythema or evidence of infection.  Had no injury.  He did report nightmares while using Chantix.  Was recommended that he stop his Chantix and contact his primary care provider.    OTC non-drowsy antihistamine of choice.  Follow manufactures dosing and safety guidelines.    Cool mist humidifier at night prn   Educated in proper administration of  prescription medication(s) ordered today including safety, possible SE, risks, benefits, rationale and alternatives to therapy.   Stay well hydrated    4 min of time spent in  education on basic health risks  associated with tobacco, nicotine, and vaping products. Verbalized understanding of risks. Currently using tobacco products daily.Declines further information or assistance at this time. Declines referral to tobacco cessation program.  Contact PCP regarding Chantix side effects of nightmares.     Recommend new shoes, orthotics, foot stretches, ice packs.   Through shared decision making a discussion of the Dx and DDx, management options (risks,benefits, and alternatives to planned treatment), natural progression, supportive care and indications for immediate follow-up discussed. Expressed understanding and the treatment plan was agreed upon.    Questions were encouraged and answered     Follow Up:   Return to urgent care prn if new or worsening sx or if there is no improvement in condition prn.    Educated in Red flags and indications to immediately call 911 or present to the Emergency Department.           Please note that this dictation was created using voice recognition software. I have worked with consultants from the vendor as well as technical experts from [facility] to optimize the interface. I have made every reasonable attempt to correct obvious errors, but I expect that there are errors of grammar and possibly content that I did not discover before finalizing the note.  This note was electronically signed by provider

## 2025-03-25 ENCOUNTER — OFFICE VISIT (OUTPATIENT)
Dept: URGENT CARE | Facility: CLINIC | Age: 45
End: 2025-03-25
Payer: COMMERCIAL

## 2025-03-25 VITALS
WEIGHT: 165.12 LBS | HEART RATE: 118 BPM | TEMPERATURE: 102.1 F | SYSTOLIC BLOOD PRESSURE: 96 MMHG | HEIGHT: 69 IN | RESPIRATION RATE: 21 BRPM | DIASTOLIC BLOOD PRESSURE: 56 MMHG | OXYGEN SATURATION: 94 % | BODY MASS INDEX: 24.46 KG/M2

## 2025-03-25 DIAGNOSIS — J18.9 PNEUMONIA OF BOTH LUNGS DUE TO INFECTIOUS ORGANISM, UNSPECIFIED PART OF LUNG: ICD-10-CM

## 2025-03-25 DIAGNOSIS — J22 LRTI (LOWER RESPIRATORY TRACT INFECTION): ICD-10-CM

## 2025-03-25 DIAGNOSIS — R05.2 SUBACUTE COUGH: ICD-10-CM

## 2025-03-25 DIAGNOSIS — R00.0 TACHYCARDIA: ICD-10-CM

## 2025-03-25 DIAGNOSIS — R50.9 FEVER, UNSPECIFIED FEVER CAUSE: ICD-10-CM

## 2025-03-25 PROCEDURE — 99214 OFFICE O/P EST MOD 30 MIN: CPT | Performed by: PHYSICIAN ASSISTANT

## 2025-03-25 PROCEDURE — 3074F SYST BP LT 130 MM HG: CPT | Performed by: PHYSICIAN ASSISTANT

## 2025-03-25 PROCEDURE — 3078F DIAST BP <80 MM HG: CPT | Performed by: PHYSICIAN ASSISTANT

## 2025-03-25 RX ORDER — ALBUTEROL SULFATE 90 UG/1
1-2 INHALANT RESPIRATORY (INHALATION) EVERY 4 HOURS PRN
Qty: 1 EACH | Refills: 0 | Status: SHIPPED | OUTPATIENT
Start: 2025-03-25

## 2025-03-25 RX ORDER — DOXYCYCLINE HYCLATE 100 MG
100 TABLET ORAL 2 TIMES DAILY
Qty: 14 TABLET | Refills: 0 | Status: SHIPPED | OUTPATIENT
Start: 2025-03-25 | End: 2025-04-01

## 2025-03-25 RX ORDER — DEXTROMETHORPHAN HYDROBROMIDE AND PROMETHAZINE HYDROCHLORIDE 15; 6.25 MG/5ML; MG/5ML
5 SYRUP ORAL EVERY 4 HOURS PRN
Qty: 118 ML | Refills: 0 | Status: SHIPPED | OUTPATIENT
Start: 2025-03-25

## 2025-03-25 ASSESSMENT — FIBROSIS 4 INDEX: FIB4 SCORE: 0.51

## 2025-03-26 ENCOUNTER — APPOINTMENT (OUTPATIENT)
Dept: RADIOLOGY | Facility: IMAGING CENTER | Age: 45
End: 2025-03-26
Attending: PHYSICIAN ASSISTANT
Payer: COMMERCIAL

## 2025-03-26 PROCEDURE — 71046 X-RAY EXAM CHEST 2 VIEWS: CPT | Mod: TC,FY | Performed by: RADIOLOGY

## 2025-03-26 NOTE — PROGRESS NOTES
Subjective:     Verbal consent was acquired by the patient to use [employer] ambient listening note generation during this visit     Gadiel Edgar is a 44 y.o. male who presents for Cough (Chest congestion with mucus, wheezing x2 weeks)       History of Present Illness  The patient presents for evaluation of cough, fever, and body aches.    He has been experiencing a persistent illness for the past 2 weeks, characterized by severe allergies, body aches, and chest pain. He was previously prescribed benzonatate for his cough, but this treatment appears to have exacerbated his symptoms, leading to increased phlegm production and constant overheating and subjective fevers at home. His cough is productive, yielding a small amount of lining and copious amounts of foamy, white, clear saliva when he maintains hydration. Without adequate water intake, his cough becomes non-productive. He also reports significant head pressure and sinus issues, with a recent episode of throbbing pain in his left eye and brain. He has been compliant with his allergy medication regimen. He experiences shortness of breath, which has been present since his last visit. His cough is severe enough to disrupt his sleep, and he reports frequent urination due to high fluid intake. He has a history of pneumonia and has been using Mucinex for symptom management.    He has been febrile since 03/19/2025, with intermittent periods of cooling followed by episodes of internal burning sensation. He also reports chills. He has not taken any antipyretics today but did take Tylenol recently, which did not alleviate his headache. He has also tried naproxen.        SOCIAL HISTORY  He admits to smoking.    ALLERGIES  The patient has no known allergies.    MEDICATIONS  Current: Mucinex, Tylenol, naproxen        Medications:  albuterol Aers  benzonatate Caps  dibucaine Oint  hydrocortisone Supp  rosuvastatin Tabs  senna-docusate Tabs  sildenafil  "citrate  varenicline    Allergies:             Patient has no known allergies.    Past Social Hx:  Gadiel Edgar  reports that he has been smoking cigars and cigarettes. He has quit using smokeless tobacco.  His smokeless tobacco use included chew. He reports current alcohol use. He reports that he does not currently use drugs after having used the following drugs: Marijuana.           Problem list, medications, and allergies reviewed by myself today in Epic.     Objective:     BP 96/56 (BP Location: Left arm, Patient Position: Sitting, BP Cuff Size: Large adult)   Pulse (!) 118   Temp (!) 38.9 °C (102.1 °F) (Oral)   Resp (!) 21   Ht 1.753 m (5' 9\")   Wt 74.9 kg (165 lb 2 oz)   SpO2 94%   BMI 24.38 kg/m²     Physical Exam  Vitals and nursing note reviewed.   Constitutional:       General: He is not in acute distress.     Appearance: He is well-developed and well-groomed. He is not ill-appearing, toxic-appearing or diaphoretic.   HENT:      Head: Normocephalic and atraumatic.      Right Ear: Tympanic membrane, ear canal and external ear normal. Tympanic membrane is not erythematous or bulging.      Left Ear: Ear canal and external ear normal. Tympanic membrane is not erythematous or bulging.      Nose: Mucosal edema, congestion and rhinorrhea present.      Mouth/Throat:      Mouth: Mucous membranes are moist.      Pharynx: Uvula midline. Pharyngeal swelling and posterior oropharyngeal erythema present. No oropharyngeal exudate or uvula swelling.      Tonsils: No tonsillar exudate or tonsillar abscesses.   Eyes:      General:         Right eye: No discharge.         Left eye: No discharge.      Conjunctiva/sclera: Conjunctivae normal.      Pupils: Pupils are equal, round, and reactive to light.   Cardiovascular:      Rate and Rhythm: Normal rate and regular rhythm.      Pulses: Normal pulses.      Heart sounds: Normal heart sounds. No murmur heard.     No friction rub.   Pulmonary:      Effort: " Pulmonary effort is normal. No tachypnea, accessory muscle usage, prolonged expiration or respiratory distress.      Breath sounds: No stridor or decreased air movement. Rhonchi present. No decreased breath sounds, wheezing or rales.      Comments: Rhonchi heard throughout bilateral lungs.  No significant rales appreciated.  Trace expiratory wheeze.  Abdominal:      Palpations: Abdomen is soft.   Musculoskeletal:         General: Normal range of motion.      Cervical back: Normal range of motion. No rigidity.      Right lower leg: No edema.      Left lower leg: No edema.   Lymphadenopathy:      Cervical: No cervical adenopathy.   Skin:     General: Skin is warm and dry.   Neurological:      Mental Status: He is alert and oriented to person, place, and time.   Psychiatric:         Behavior: Behavior is cooperative.                 Assessment/Plan:     Diagnosis and Associated Orders:     1. LRTI (lower respiratory tract infection)  - DX-CHEST-2 VIEWS  - doxycycline (VIBRAMYCIN) 100 MG Tab; Take 1 Tablet by mouth 2 times a day for 7 days.  Dispense: 14 Tablet; Refill: 0  - albuterol 108 (90 Base) MCG/ACT Aero Soln inhalation aerosol; Inhale 1-2 Puffs every four hours as needed for Shortness of Breath.  Dispense: 1 Each; Refill: 0    2. Pneumonia of both lungs due to infectious organism, unspecified part of lung  - DX-CHEST-2 VIEWS  - doxycycline (VIBRAMYCIN) 100 MG Tab; Take 1 Tablet by mouth 2 times a day for 7 days.  Dispense: 14 Tablet; Refill: 0  - albuterol 108 (90 Base) MCG/ACT Aero Soln inhalation aerosol; Inhale 1-2 Puffs every four hours as needed for Shortness of Breath.  Dispense: 1 Each; Refill: 0    3. Subacute cough  - promethazine-dextromethorphan (PROMETHAZINE-DM) 6.25-15 MG/5ML syrup; Take 5 mL by mouth every four hours as needed for Cough.  Dispense: 118 mL; Refill: 0        Medical Decision Making:    Pleasant 44 y.o. presents to clinic with: Fever and tachycardia manifested as systemic  illness    Assessment & Plan  1. Suspected pneumonia, unfortunately no x-ray/imaging available today at this site.  His lung sounds with generalized rhonchi, and he presents with a high fever, elevated heart rate, and general malaise. Although his oxygen saturation is currently acceptable and he is not experiencing significant dyspnea, the possibility of pneumonia can not be ruled out. A chest x-ray will be ordered to further investigate the cause of his symptoms. A prescription for doxycycline will be sent to [facility]. An albuterol inhaler will be provided for use every 6 hours to alleviate chest tightness and shortness of breath. He is advised to continue using Mucinex during the day to help thin lung secretions and facilitate expectoration. A cough syrup will be prescribed for nighttime use to aid sleep and manage the cough. He is also advised to elevate the head of his bed while sleeping. If his symptoms worsen overnight or if he experiences severe shortness of breath, he should seek immediate medical attention at the emergency room.    2. Fever.  He has had a fever for an entire week. The fever is accompanied by chills and an elevated heart rate. He is advised to monitor his temperature and use over-the-counter medications like Tylenol or ibuprofen if needed. If the fever persists or worsens, further evaluation will be necessary.    3. Body aches.  He reports body aches that come and go, with the whole body aching at times. The body aches have been ongoing for a couple of weeks. He is advised to rest and stay hydrated. Over-the-counter pain relievers like Tylenol or ibuprofen can be used as needed.    Follow-up  The patient will follow up in 7 to 10 days to ensure that his condition is improving with the prescribed antibiotics.    Will be in touch with him when his x-ray has been completed, he will need to go to alternative facility to have this done, discussed importance of this    I personally  reviewed prior external notes and test results pertinent to today's visit. Patient is clinically stable at today's urgent care visit.  Patient appears nontoxic with no acute distress noted. Appropriate for outpatient care at this time.  Return to clinic or go to ED if symptoms worsen or persist.  Red flag symptoms discussed.  Patient/Parent/Guardian voices understanding.   All side effects of medication discussed including allergic response, GI upset, tendon injury, rash, sedation etc    Please note that this dictation was created using voice recognition software. I have made a reasonable attempt to correct obvious errors, but I expect that there are errors of grammar and possibly content that I did not discover before finalizing the note.    This note was electronically signed by Vy Boateng PA-C

## 2025-04-02 ENCOUNTER — APPOINTMENT (OUTPATIENT)
Dept: MEDICAL GROUP | Facility: LAB | Age: 45
End: 2025-04-02
Payer: COMMERCIAL

## 2025-04-02 VITALS
DIASTOLIC BLOOD PRESSURE: 74 MMHG | TEMPERATURE: 98.3 F | RESPIRATION RATE: 16 BRPM | SYSTOLIC BLOOD PRESSURE: 128 MMHG | HEIGHT: 69 IN | OXYGEN SATURATION: 94 % | WEIGHT: 165 LBS | BODY MASS INDEX: 24.44 KG/M2 | HEART RATE: 88 BPM

## 2025-04-02 DIAGNOSIS — F33.0 MILD EPISODE OF RECURRENT MAJOR DEPRESSIVE DISORDER (HCC): ICD-10-CM

## 2025-04-02 DIAGNOSIS — G43.109 OCULAR MIGRAINE: ICD-10-CM

## 2025-04-02 DIAGNOSIS — J18.9 ATYPICAL PNEUMONIA: ICD-10-CM

## 2025-04-02 PROCEDURE — 99214 OFFICE O/P EST MOD 30 MIN: CPT | Performed by: FAMILY MEDICINE

## 2025-04-02 PROCEDURE — 3074F SYST BP LT 130 MM HG: CPT | Performed by: FAMILY MEDICINE

## 2025-04-02 PROCEDURE — 3078F DIAST BP <80 MM HG: CPT | Performed by: FAMILY MEDICINE

## 2025-04-02 ASSESSMENT — FIBROSIS 4 INDEX: FIB4 SCORE: 0.51

## 2025-04-02 NOTE — PROGRESS NOTES
Verbal consent was acquired by the patient to use [email or web address] ambient listening note generation during this visit Yes    Subjective:   Gadiel Edgar is a 44 y.o. male here today for   Chief Complaint   Patient presents with    Paperwork    [facility]     History of Present Illness  44-year-old male presents for follow-up on walking pneumonia diagnosed on 03/25/2025. Treated with doxycycline and promethazine. Here for FMLA paperwork.    Initially had head pressure and migraines attributed to allergies, then developed body aches persisting for 2.5 weeks. Despite initial urgent care visit with no abnormalities, condition worsened, returned and prescribed benzonatate for cough. Significant improvement post-antibiotics by Dr. Fernandes, but still wheezing. Plans to resume work tomorrow after completing antibiotics.  Denies any fevers, chills.  Denies any significant shortness of breath but does continue to have cough.    Patient does have significant history of depression as well as chronic ocular migraines which have caused symptoms that have prohibited him from completing work.  FMLA paperwork previously completed back in August 2024.  Reports worsened migraines and depression, requesting FMLA coverage for 5-10 days/month due to inability to perform basic duties.      No Known Allergies      Current medicines (including changes today)  Current Outpatient Medications   Medication Sig Dispense Refill    albuterol 108 (90 Base) MCG/ACT Aero Soln inhalation aerosol Inhale 1-2 Puffs every four hours as needed for Shortness of Breath. 1 Each 0    promethazine-dextromethorphan (PROMETHAZINE-DM) 6.25-15 MG/5ML syrup Take 5 mL by mouth every four hours as needed for Cough. 118 mL 0    benzonatate (TESSALON) 100 MG Cap Take 1 Capsule by mouth 3 times a day as needed for Cough. (Patient not taking: Reported on 3/25/2025) 60 Capsule 0    varenicline (CHANTIX) 1 MG tablet Take 1 Tablet by  "mouth 2 times a day. 60 Tablet 3    varenicline (CHANTIX) 1 MG tablet Take 1 Tablet by mouth 2 times a day. 60 Tablet 3    rosuvastatin (CRESTOR) 20 MG Tab Take 1 Tablet by mouth every evening. 90 Tablet 3    sildenafil citrate (VIAGRA) 50 MG tablet Take 1 Tablet by mouth 1 time a day as needed for Erectile Dysfunction. 90 Tablet 3    dibucaine (PERIANAL) 1 % Ointment Apply 1 Application topically 3 times a day. (Patient not taking: Reported on 3/25/2025) 28 g 0    hydrocortisone (ANUSOL-HC) 25 MG Suppos Insert 1 Suppository into the rectum every 12 hours. (Patient not taking: Reported on 3/25/2025) 12 Suppository 0    senna-docusate (PERICOLACE OR SENOKOT S) 8.6-50 MG Tab Take 1 Tablet by mouth every day. (Patient not taking: Reported on 3/25/2025) 30 Tablet 0    albuterol 108 (90 Base) MCG/ACT Aero Soln inhalation aerosol Inhale 2 Puffs every 6 hours as needed for Shortness of Breath. (Patient not taking: Reported on 3/25/2025) 8.5 g 0     No current facility-administered medications for this visit.     He  has a past medical history of Patient denies medical problems.    ROS   ROS  -See HPI     Objective:     Physical Exam:  /74   Pulse 88   Temp 36.8 °C (98.3 °F) (Temporal)   Resp 16   Ht 1.753 m (5' 9.02\")   Wt 74.8 kg (165 lb)   SpO2 94%  Body mass index is 24.35 kg/m².   Constitutional: Alert, no distress, well-groomed.  Skin: No rashes in visible areas.  Eye: Round. Conjunctiva clear, lids normal. No icterus.   ENMT: Lips pink without lesions, good dentition, moist mucous membranes. Phonation normal.  Neck: No masses, no thyromegaly. Moves freely without pain.  Respiratory: Unlabored respiratory effort, no cough or audible wheeze  Psych: Alert and oriented x3, normal affect and mood.  Results      Assessment and Plan:     Assessment & Plan  1. Walking pneumonia  Assessment narrative: Persistent cough may linger post-antibiotic treatment but should not impede work. Gradual recovery " expected.  Treatment plan: Antibiotic treatment with doxycycline.    2. Depression  Condition, stable.  Assessment narrative: Update [facility] paperwork to reflect current health status and work requirements, including provisions for worsened depression.    3. Migraines  Chronic condition, stable.  Assessment narrative: Update [facility] paperwork to include migraines causing work absenteeism.    Follow-up: Next scheduled visit.    Orders:  1. Atypical pneumonia    2. Mild episode of recurrent major depressive disorder (HCC)    3. Ocular migraine        Followup: No follow-ups on file.         PLEASE NOTE: This dictation was created using voice recognition and [employer] ambient listening software. I have made every reasonable attempt to correct obvious errors, but I expect that there are errors of grammar and possibly content that I did not discover before finalizing the note.

## 2025-05-11 ENCOUNTER — OFFICE VISIT (OUTPATIENT)
Dept: URGENT CARE | Facility: CLINIC | Age: 45
End: 2025-05-11
Payer: COMMERCIAL

## 2025-05-11 ENCOUNTER — APPOINTMENT (OUTPATIENT)
Dept: RADIOLOGY | Facility: IMAGING CENTER | Age: 45
End: 2025-05-11
Payer: COMMERCIAL

## 2025-05-11 VITALS
BODY MASS INDEX: 24.97 KG/M2 | WEIGHT: 168.6 LBS | SYSTOLIC BLOOD PRESSURE: 122 MMHG | TEMPERATURE: 97.7 F | HEART RATE: 76 BPM | RESPIRATION RATE: 18 BRPM | DIASTOLIC BLOOD PRESSURE: 84 MMHG | OXYGEN SATURATION: 100 % | HEIGHT: 69 IN

## 2025-05-11 DIAGNOSIS — R06.02 SHORTNESS OF BREATH: ICD-10-CM

## 2025-05-11 DIAGNOSIS — R05.9 COUGH, UNSPECIFIED TYPE: ICD-10-CM

## 2025-05-11 DIAGNOSIS — R05.3 CHRONIC COUGH: ICD-10-CM

## 2025-05-11 DIAGNOSIS — R06.2 WHEEZING: ICD-10-CM

## 2025-05-11 PROCEDURE — 71046 X-RAY EXAM CHEST 2 VIEWS: CPT | Mod: TC,FY | Performed by: RADIOLOGY

## 2025-05-11 PROCEDURE — 94640 AIRWAY INHALATION TREATMENT: CPT

## 2025-05-11 PROCEDURE — 96372 THER/PROPH/DIAG INJ SC/IM: CPT

## 2025-05-11 PROCEDURE — 99214 OFFICE O/P EST MOD 30 MIN: CPT | Mod: 25

## 2025-05-11 RX ORDER — BENZONATATE 100 MG/1
100 CAPSULE ORAL 3 TIMES DAILY PRN
Qty: 30 CAPSULE | Refills: 0 | Status: SHIPPED | OUTPATIENT
Start: 2025-05-11 | End: 2025-05-21

## 2025-05-11 RX ORDER — DEXTROMETHORPHAN HYDROBROMIDE AND PROMETHAZINE HYDROCHLORIDE 15; 6.25 MG/5ML; MG/5ML
5 SYRUP ORAL EVERY 6 HOURS PRN
Qty: 118 ML | Refills: 0 | Status: SHIPPED | OUTPATIENT
Start: 2025-05-11 | End: 2025-05-18

## 2025-05-11 RX ORDER — KETOROLAC TROMETHAMINE 15 MG/ML
15 INJECTION, SOLUTION INTRAMUSCULAR; INTRAVENOUS ONCE
Status: COMPLETED | OUTPATIENT
Start: 2025-05-11 | End: 2025-05-11

## 2025-05-11 RX ORDER — METHYLPREDNISOLONE 4 MG/1
TABLET ORAL
Qty: 21 TABLET | Refills: 0 | Status: SHIPPED | OUTPATIENT
Start: 2025-05-11 | End: 2025-05-24

## 2025-05-11 RX ORDER — ALBUTEROL SULFATE 0.83 MG/ML
2.5 SOLUTION RESPIRATORY (INHALATION) ONCE
Status: COMPLETED | OUTPATIENT
Start: 2025-05-11 | End: 2025-05-11

## 2025-05-11 RX ORDER — ALBUTEROL SULFATE 90 UG/1
2 INHALANT RESPIRATORY (INHALATION) EVERY 6 HOURS PRN
Qty: 8.5 G | Refills: 0 | Status: SHIPPED | OUTPATIENT
Start: 2025-05-11

## 2025-05-11 RX ADMIN — KETOROLAC TROMETHAMINE 15 MG: 15 INJECTION, SOLUTION INTRAMUSCULAR; INTRAVENOUS at 12:13

## 2025-05-11 RX ADMIN — ALBUTEROL SULFATE 2.5 MG: 0.83 SOLUTION RESPIRATORY (INHALATION) at 11:53

## 2025-05-11 ASSESSMENT — FIBROSIS 4 INDEX: FIB4 SCORE: 0.51

## 2025-05-11 ASSESSMENT — ENCOUNTER SYMPTOMS
COUGH: 1
SPUTUM PRODUCTION: 0
WHEEZING: 1
SORE THROAT: 0
CARDIOVASCULAR NEGATIVE: 1
SHORTNESS OF BREATH: 1
FEVER: 1
CHILLS: 1
MYALGIAS: 0
GASTROINTESTINAL NEGATIVE: 1

## 2025-05-11 NOTE — LETTER
May 11, 2025    To Whom It May Concern:         This is confirmation that Gadiel Edgar attended his scheduled appointment with ANDREY Negron on 5/11/25. He should be excused from work 5/12/25 and can return when he is feeling better.          If you have any questions please do not hesitate to call me at the phone number listed below.    Sincerely,          PORTIA Negrno.  [phone]

## 2025-05-11 NOTE — PROGRESS NOTES
Subjective:     Chief Complaint   Patient presents with    Cough     Lungs full of fluids, dry cough and coughing up phlegm,  X 3 months        HPI:  Gadiel Edgar is a 44 y.o. male who presents for Cough and SOB. He report that he was seen 3/25 and diagnosed with pnuemonia. Took full course of antibiotics and states he got better shortly but got worse again. Report SOB, wheezing and dry cough, which are worse at night. Denies h/o asthma/copd. No immunocompromise. Has tried OTC cold medications without significant relief of symptoms. No recent ABX use. No other aggravating or alleviating factors. Denies sick exposure. Pt is a smoker, 6-10 cigarettes daily.      ROS:  Review of Systems   Constitutional:  Positive for chills, fever and malaise/fatigue.   HENT:  Negative for congestion and sore throat.    Respiratory:  Positive for cough, shortness of breath and wheezing. Negative for sputum production.    Cardiovascular: Negative.  Negative for chest pain.   Gastrointestinal: Negative.    Genitourinary: Negative.    Musculoskeletal:  Negative for myalgias.        CURRENT MEDICATIONS:  Current Outpatient Medications   Medication Sig Refill Last Dispense    albuterol 108 (90 Base) MCG/ACT Aero Soln inhalation aerosol Inhale 1-2 Puffs every four hours as needed for Shortness of Breath. 0 Unknown (outside pharmacy)    promethazine-dextromethorphan (PROMETHAZINE-DM) 6.25-15 MG/5ML syrup Take 5 mL by mouth every four hours as needed for Cough. 0 Unknown (outside pharmacy)    rosuvastatin (CRESTOR) 20 MG Tab Take 1 Tablet by mouth every evening. 3 Unknown (outside pharmacy)    sildenafil citrate (VIAGRA) 50 MG tablet Take 1 Tablet by mouth 1 time a day as needed for Erectile Dysfunction. 3 Unknown (outside pharmacy)       ALLERGIES:   No Known Allergies    PROBLEM LIST:    does not have any pertinent problems on file.    Allergies, Medications, & Tobacco/Substance Use were reconciled by the Medical Assistant and  "reviewed by myself.     Objective:   /84 (BP Location: Left arm, Patient Position: Sitting, BP Cuff Size: Adult)   Pulse 76   Temp 36.5 °C (97.7 °F) (Temporal)   Resp 18   Ht 1.753 m (5' 9\")   Wt 76.5 kg (168 lb 9.6 oz)   SpO2 100%   BMI 24.90 kg/m²     Physical Exam  Constitutional:       General: He is not in acute distress.     Appearance: He is not ill-appearing or toxic-appearing.   HENT:      Nose: Nose normal.      Mouth/Throat:      Mouth: Mucous membranes are moist.      Pharynx: Oropharynx is clear.   Eyes:      Conjunctiva/sclera: Conjunctivae normal.      Pupils: Pupils are equal, round, and reactive to light.   Cardiovascular:      Rate and Rhythm: Normal rate and regular rhythm.   Pulmonary:      Effort: Pulmonary effort is normal.      Breath sounds: Examination of the right-middle field reveals wheezing. Examination of the left-middle field reveals wheezing. Examination of the right-lower field reveals wheezing. Examination of the left-lower field reveals wheezing. Wheezing present.   Abdominal:      General: Abdomen is flat.      Palpations: Abdomen is soft.   Skin:     General: Skin is warm and dry.   Neurological:      Mental Status: He is alert.         Assessment/Plan:   Pt's history and physical exam consistent with continued chronic cough and wheezing after recent walking pna, cause and prognosis unknown. Pt is peaking in full clear sentences and WOB appears normal. Patient has stable vital signs and is non-toxic appearing. Pt states the breathing treatment given in clinic did help him to feel less SOB. Given tordol shot for complaints of muscle soreness and chest wall pain due to coughing. Discussed supportive care measures and maintaining adequate hydration. Patient demonstrated understanding of treatment plan and agreed to return to the clinic if symptoms worsen or fail to resolve. Encouraged f/u with PCP in the next week.      Assessment & Plan  Chronic cough  Orders:    " DX-CHEST-2 VIEWS; Future    albuterol (Proventil) 2.5mg/3ml nebulizer solution 2.5 mg    albuterol 108 (90 Base) MCG/ACT Aero Soln inhalation aerosol; Inhale 2 Puffs every 6 hours as needed for Shortness of Breath.    benzonatate (TESSALON) 100 MG Cap; Take 1 Capsule by mouth 3 times a day as needed for Cough for up to 10 days.    promethazine-dextromethorphan (PROMETHAZINE-DM) 6.25-15 MG/5ML syrup; Take 5 mL by mouth every 6 hours as needed for Cough for up to 7 days. (caution: may cause sedation)    ketorolac (Toradol) 15 MG/ML injection 15 mg  - No evidence of a bacterial process.   - Encouraged pt to treat symptoms by using humidified air, salt water gargles, and/or sipping warm liquids.   - Educated pt on use of OTC tylenol or ibuprofen (if no contraindications) per package instructions for body aches.  - Discussed nonsedating antihistamine like Zyrtec (cetirizine) or Allegra (fexofenadine) to reduce the amount of nasal inflammation.  - 5 minutes was spent in educating patient of health risks associated with tobacco, nicotine, and vaping. Verbalized understanding. He has not been successful in quitting in the past. Declines further information or assistance at this time.   - Pt encouraged to practice hand hygiene, wear a face mask in public or self isolate, remain well hydrated, and get sufficient rest/sleep.     Shortness of breath  Orders:    DX-CHEST-2 VIEWS; Future    albuterol (Proventil) 2.5mg/3ml nebulizer solution 2.5 mg    albuterol 108 (90 Base) MCG/ACT Aero Soln inhalation aerosol; Inhale 2 Puffs every 6 hours as needed for Shortness of Breath.      Wheezing  Orders:    DX-CHEST-2 VIEWS; Future    albuterol (Proventil) 2.5mg/3ml nebulizer solution 2.5 mg    methylPREDNISolone (MEDROL DOSEPAK) 4 MG Tablet Therapy Pack; Follow schedule on package instructions.    albuterol 108 (90 Base) MCG/ACT Aero Soln inhalation aerosol; Inhale 2 Puffs every 6 hours as needed for Shortness of Breath.      Discussed  differential diagnosis, management options, risks/benefits, and alternatives to planned treatment. Pt expressed understanding and the treatment plan was agreed upon. Questions were encouraged and answered. Pt encouraged to return to urgent care as needed if new or worsening symptoms or if there is no improvement in condition. Pt educated in red flags and indications to immediately call 911 or present to the Emergency Department. Advised the patient to follow-up with the primary care physician for recheck, reevaluation, and further management.    I personally reviewed prior external notes and test results pertinent to today's visit. I have independently reviewed and interpreted all diagnostics ordered during this visit.    Please note that this dictation was created using voice recognition software. I have made a reasonable attempt to correct obvious errors, but I expect that there are errors of grammar and possibly content that I did not discover before finalizing the note.    This note was electronically signed by TIANA Arias

## 2025-05-24 ENCOUNTER — APPOINTMENT (OUTPATIENT)
Dept: RADIOLOGY | Facility: MEDICAL CENTER | Age: 45
End: 2025-05-24
Attending: EMERGENCY MEDICINE
Payer: COMMERCIAL

## 2025-05-24 ENCOUNTER — HOSPITAL ENCOUNTER (EMERGENCY)
Facility: MEDICAL CENTER | Age: 45
End: 2025-05-24
Attending: EMERGENCY MEDICINE
Payer: COMMERCIAL

## 2025-05-24 VITALS
DIASTOLIC BLOOD PRESSURE: 76 MMHG | HEIGHT: 69 IN | TEMPERATURE: 97.7 F | WEIGHT: 165.34 LBS | HEART RATE: 79 BPM | SYSTOLIC BLOOD PRESSURE: 132 MMHG | RESPIRATION RATE: 19 BRPM | BODY MASS INDEX: 24.49 KG/M2 | OXYGEN SATURATION: 94 %

## 2025-05-24 DIAGNOSIS — R06.2 WHEEZING: ICD-10-CM

## 2025-05-24 PROCEDURE — 700111 HCHG RX REV CODE 636 W/ 250 OVERRIDE (IP): Performed by: EMERGENCY MEDICINE

## 2025-05-24 PROCEDURE — 71045 X-RAY EXAM CHEST 1 VIEW: CPT

## 2025-05-24 PROCEDURE — 94640 AIRWAY INHALATION TREATMENT: CPT

## 2025-05-24 PROCEDURE — 94760 N-INVAS EAR/PLS OXIMETRY 1: CPT

## 2025-05-24 PROCEDURE — A9270 NON-COVERED ITEM OR SERVICE: HCPCS | Performed by: EMERGENCY MEDICINE

## 2025-05-24 PROCEDURE — 700101 HCHG RX REV CODE 250: Performed by: EMERGENCY MEDICINE

## 2025-05-24 PROCEDURE — 700102 HCHG RX REV CODE 250 W/ 637 OVERRIDE(OP): Performed by: EMERGENCY MEDICINE

## 2025-05-24 PROCEDURE — 99284 EMERGENCY DEPT VISIT MOD MDM: CPT

## 2025-05-24 RX ORDER — ALBUTEROL SULFATE 90 UG/1
2 INHALANT RESPIRATORY (INHALATION) EVERY 6 HOURS PRN
Qty: 8.5 G | Refills: 0 | Status: SHIPPED | OUTPATIENT
Start: 2025-05-24

## 2025-05-24 RX ORDER — ALBUTEROL SULFATE 90 UG/1
2 INHALANT RESPIRATORY (INHALATION) ONCE
Status: COMPLETED | OUTPATIENT
Start: 2025-05-24 | End: 2025-05-24

## 2025-05-24 RX ORDER — PREDNISONE 20 MG/1
60 TABLET ORAL DAILY
Qty: 12 TABLET | Refills: 0 | Status: SHIPPED | OUTPATIENT
Start: 2025-05-24 | End: 2025-05-28

## 2025-05-24 RX ORDER — ALBUTEROL SULFATE 5 MG/ML
2.5 SOLUTION RESPIRATORY (INHALATION) ONCE
Status: COMPLETED | OUTPATIENT
Start: 2025-05-24 | End: 2025-05-24

## 2025-05-24 RX ADMIN — ALBUTEROL SULFATE 2 PUFF: 90 AEROSOL, METERED RESPIRATORY (INHALATION) at 11:03

## 2025-05-24 RX ADMIN — ALBUTEROL SULFATE 2.5 MG: 2.5 SOLUTION RESPIRATORY (INHALATION) at 09:18

## 2025-05-24 RX ADMIN — PREDNISONE 60 MG: 50 TABLET ORAL at 09:10

## 2025-05-24 ASSESSMENT — FIBROSIS 4 INDEX: FIB4 SCORE: 0.51

## 2025-05-24 NOTE — ED TRIAGE NOTES
"Pt to er with c/o chest congestion and cough with intermitt sob \"since march\". Denies other recent sick contacts  "

## 2025-05-24 NOTE — ED NOTES
Pt ambulates. Pt states he has had a cough since march with mucus production. Pt states he has been here a couple of times for this but it was too busy. Pt states he was seen at an urgent care 3 or 4 times was given antibiotics in march, finished the antibitics was given steroids about four weeks ago, finished the steroids. Pt states symptoms are persisting. Pt states history of smoking cigarettes.

## 2025-05-24 NOTE — DISCHARGE INSTRUCTIONS
I would encourage you to stop smoking.  Drink plenty of fluids.  Take medicines as prescribed.  You can use the inhaler with 4 puffs in the spacer every 4-6 hours for today then go to 2 puffs every 6 hours.  Return to the ER or worsening shortness of breath, worsening cough, if you develop fever or have other complaints.

## 2025-05-24 NOTE — ED PROVIDER NOTES
"ED Provider Note    CHIEF COMPLAINT  No chief complaint on file.  Cough, shortness of breath.    EXTERNAL RECORDS REVIEWED  Reviewed previous ED notes and previous urgent care notes.    HPI/ROS  LIMITATION TO HISTORY   Select: : None  OUTSIDE HISTORIAN(S):  None.    Gadiel Edgar is a 44 y.o. male who presents to the emergency department complaining of cough, wheezing and shortness of breath.  Symptoms have been present for several weeks.  Has been to the urgent care, he has been treated with antibiotics, prescribed Tessalon, treat with Medrol Dosepak and treated with albuterol.  Despite that he can use of episodes of wheezing and cough.  The albuterol does improve his symptoms.  Denies any history of asthma.  Cough is productive of yellow and occasionally green sputum.  No sore throat runny nose no chest pain.  Denies any other aggravating alleviating factors.  Does continue to smoke.    PAST MEDICAL HISTORY   has a past medical history of Patient denies medical problems.    SURGICAL HISTORY  patient denies any surgical history    FAMILY HISTORY  Family History   Problem Relation Age of Onset    Hypertension Mother     Hyperlipidemia Mother     Hypertension Father     Hyperlipidemia Father        SOCIAL HISTORY  Social History     Tobacco Use    Smoking status: Every Day     Current packs/day: 0.25     Types: Cigars, Cigarettes    Smokeless tobacco: Former     Types: Chew    Tobacco comments:     since 2006    Vaping Use    Vaping status: Never Used   Substance and Sexual Activity    Alcohol use: Yes     Comment: Rare    Drug use: Not Currently     Types: Marijuana     Comment: occ    Sexual activity: Not on file       CURRENT MEDICATIONS  Home Medications    **Home medications have not yet been reviewed for this encounter**         ALLERGIES  Allergies[1]    PHYSICAL EXAM  VITAL SIGNS: BP (!) 154/100   Pulse 91   Temp 37.1 °C (98.7 °F) (Temporal)   Resp 16   Ht 1.753 m (5' 9\")   Wt 75 kg (165 lb 5.5 " oz)   SpO2 95%   BMI 24.42 kg/m²    Constitutional: Well developed, Well nourished, No acute distress, Non-toxic appearance.   HENT: Normocephalic, Atraumatic, no pharyngeal erythema.  Nose normal  Eyes: PERRL, EOMI, Conjunctiva normal, No discharge.   Neck: Normal range of motion,  Cardiovascular: Normal heart rate, Normal rhythm, No murmurs, \  Thorax & Lungs: Wheezing throughout with occasional rhonchi.  No crackles.  Abdomen: Soft nontender nondistended  Skin: Warm, Dry, No erythema, No rash.   Back: No tenderness, No CVA tenderness.   Musculoskeletal: Good range of motion in all major joints.   Neurologic: Alert, No focal deficits noted.         EKG/LABS    None indicated.    RADIOLOGY/PROCEDURES   I have independently interpreted the diagnostic imaging associated with this visit and am waiting the final reading from the radiologist.   My preliminary interpretation is as follows: Reviewed x-ray agree with radiology results.    Radiologist interpretation:  DX-CHEST-PORTABLE (1 VIEW)   Final Result         1. No acute cardiopulmonary abnormalities identified.                         COURSE & MEDICAL DECISION MAKING    ASSESSMENT, COURSE AND PLAN  Care Narrative:     The 44-year-old male with a history of tobacco use comes with wheezing, cough, shortness of breath this been persistent for several weeks.  Has been treated with antibiotics, has had x-rays and put on cough medicines, treated with a Medrol Dosepak and provided albuterol metered-dose inhaler.  All of these treatments help a little bit but nothing has led to resolution of his symptoms.    Today he is wheezing.  He does not have any fever, or URI symptoms.  Due to the Alda that he has an acute viral infection think this is just bronchospasm likely due to his tobacco use.  Will treat him with some prednisone and a breathing treatment.  To get an x-ray to make sure is no infiltrate.    The patient has been here for chest pain and had a cardiac  workup which has reassuring results as well.  I do not think this is cardiac wheezing in nature.      Patient received a breathing treatment and is essentially completely better.  Repeat lung exam is clear with good air movement.  Will observe him for a period of time.  After an hour he is reassessed.  He still feeling much better although he feels that he might be wheezing a little bit.  Chest is reexamined he has a little bit of wheezing.  When given a spacer and couple puffs with inhaler breath he can be discharged with prednisone at adequate dose and an albuterol MDI and spacer.  He is advised to quit smoking.  He will return to the ED if worsening cough, shortness of effu or other concerns.  Questions answered.  He is agreeable to plan.  He is discharged in good condition.          ADDITIONAL PROBLEMS MANAGED  History of tobacco use.  Smoking cessation discussed.    DISPOSITION AND DISCUSSIONS  \    Escalation of care considered, and ultimately not performed:Laboratory analysis    \    FINAL DIAGNOSIS  1. Wheezing         Electronically signed by: Padilla Hart M.D., 5/24/2025 8:41 AM           [1] No Known Allergies

## 2025-05-24 NOTE — ED NOTES
Dc instructions and prescriptions reviewed with pt. Aware of need to  same  at [facility] [address], start steroids tomorrow, inhaler as needed, f/u with pcp , return for worsening s/s

## 2025-05-24 NOTE — ED NOTES
Pt instructed in use of spacer and demonstrated use of same with  prescribed inhaler. For d/c after same

## 2025-06-04 ENCOUNTER — OFFICE VISIT (OUTPATIENT)
Dept: MEDICAL GROUP | Facility: LAB | Age: 45
End: 2025-06-04
Payer: COMMERCIAL

## 2025-06-04 VITALS
BODY MASS INDEX: 23.55 KG/M2 | SYSTOLIC BLOOD PRESSURE: 104 MMHG | DIASTOLIC BLOOD PRESSURE: 70 MMHG | TEMPERATURE: 98.2 F | HEART RATE: 78 BPM | HEIGHT: 69 IN | WEIGHT: 159 LBS | RESPIRATION RATE: 14 BRPM | OXYGEN SATURATION: 97 %

## 2025-06-04 DIAGNOSIS — R06.02 SOB (SHORTNESS OF BREATH): Primary | ICD-10-CM

## 2025-06-04 DIAGNOSIS — R05.3 CHRONIC COUGH: ICD-10-CM

## 2025-06-04 DIAGNOSIS — J01.00 ACUTE NON-RECURRENT MAXILLARY SINUSITIS: ICD-10-CM

## 2025-06-04 PROCEDURE — 3074F SYST BP LT 130 MM HG: CPT | Performed by: FAMILY MEDICINE

## 2025-06-04 PROCEDURE — 99214 OFFICE O/P EST MOD 30 MIN: CPT | Performed by: FAMILY MEDICINE

## 2025-06-04 PROCEDURE — 3078F DIAST BP <80 MM HG: CPT | Performed by: FAMILY MEDICINE

## 2025-06-04 ASSESSMENT — FIBROSIS 4 INDEX: FIB4 SCORE: 0.51

## 2025-06-04 NOTE — PROGRESS NOTES
Verbal consent was acquired by the patient to use [employer] ambient listening note generation during this visit Yes    Subjective:   Gadiel Edgar is a 44 y.o. male here today for   Chief Complaint   Patient presents with    Pneumonia     Follow up SOB, coughing on going, bloating, Would like to get further testing Images of lungs eta tired of sick      History of Present Illness  The patient is a 44-year-old male who presents today to follow up on ongoing symptoms of shortness of breath and coughing.    He has a history of lower respiratory infections that have been occurring monthly over the last 3 months. He was diagnosed with atypical pneumonia in 04/2025 and treated with doxycycline. Despite this, he continued to seek care at urgent care and the emergency room for ongoing shortness of breath, cough, and wheezing. He was treated with a course of methylprednisolone at the beginning of 05/2025, followed by a course of prednisone at the end of 05/2025, as well as albuterol.     He reports persistent illness since 03/2025, necessitating multiple visits to urgent care. Initial treatment with medication provided temporary relief, but his condition deteriorated, leading to a subsequent visit where he was prescribed antibiotics. These medications alleviated his symptoms temporarily, but he experienced a recurrence of mucus accumulation in his lungs. Radiographic imaging did not reveal any abnormalities, but a presumptive diagnosis of walking pneumonia was made, and he was again placed on antibiotics. Despite this, his condition worsened, prompting another visit to urgent care where he was started on steroids. A recent emergency room visit resulted in the prescription of stronger steroids, which provided relief while being taken, but symptoms recurred post-treatment.    He notes a change in the consistency and color of his mucus, which is now less thick, dark, and sticky, with occasional episodes of slightly  "sticky, dark mucus. Currently, he is producing lighter, yellowish, more liquid mucus. He reports experiencing shortness of breath and frequent wheezing but does not report any hemoptysis. He has reduced his smoking to 2 or 3 cigarettes daily due to increased lung sensitivity. He admits to overusing his inhaler, prescribed for use every 4 hours, which he uses 2 to 3 times daily. He recently expectorated a large chunk of mucus after watching a video on breathing techniques, which has temporarily reduced his wheezing.    He experiences significant sinus pain and pressure, along with postnasal drainage. He has been using Mucinex to thin out the mucus and reports a runny nose. He does not report any facial or head tenderness. He has been performing sinus exercises, which have resulted in loosened mucus. He experiences difficulty sleeping due to mucus buildup, leading to a scratchy and itchy throat, constant coughing, and expectoration. He has been taking Allegra intermittently but not daily. He has not been using Flonase or Astepro. He experiences shortness of breath during activities such as going to the restroom. He has purchased a nebulizer and saline solution for use during episodes of shortness of breath. He was not tested for RSV or COVID-19 last year.    SOCIAL HISTORY  - Smokes two or three cigarettes a day    FAMILY HISTORY  - Mother suffers from asthma and severe allergies      Allergies[1]      Current medicines (including changes today)  Current Medications[2]  He  has a past medical history of Patient denies medical problems.    ROS   ROS  -See HPI     Objective:     Physical Exam:  /70 (BP Location: Left arm, Patient Position: Sitting, BP Cuff Size: Adult)   Pulse 78   Temp 36.8 °C (98.2 °F) (Temporal)   Resp 14   Ht 1.753 m (5' 9.02\")   Wt 72.1 kg (159 lb)   SpO2 97%  Body mass index is 23.47 kg/m².   Constitutional: Alert, no distress.  Skin: Warm, dry, good turgor, no rashes in visible " areas.  Eye: Equal, round and reactive, conjunctiva clear, lids normal.  ENMT: TM's clear bilaterally, lips without lesions, good dentition, oropharynx significantly erythematous with cobblestoning pattern, purulent drainage noted in oropharynx  Neck: Trachea midline, no masses, no thyromegaly. No cervical or supraclavicular lymphadenopathy.  Respiratory: Unlabored respiratory effort, lungs clear to auscultation, no wheezes, no rhonchi.  Psych: Alert and oriented x3, normal affect and mood.    Results  - Imaging:    - X-ray of the lungs: No white cloudiness observed, assumed walking pneumonia    Assessment and Plan:     Assessment & Plan  Shortness of breath  Symptoms include ongoing shortness of breath, wheezing, and light yellowish mucus production. Physical exam findings include wheezing and drainage from the sinuses. Differential diagnosis includes chronic inflammation changes to the lungs, untreated sinus infection, or potentially COPD due to smoking. The possibility of a postviral cough is considered less likely given the duration of symptoms.  Diagnostic plan: Pulmonary function testing will be conducted to assess lung function.  Treatment plan: He has been instructed to limit albuterol use to 2 puffs every 4 hours at most. The use of a nebulizer is deemed acceptable but may not provide significant benefit. He has been advised to discontinue Mucinex and increase water intake.  Clinical decision making: If there are any changes in his condition or worsening symptoms such as fevers, chills, night sweats, weight loss, or hemoptysis, he should inform us immediately.    Cough  Symptoms include persistent cough with mucus production, especially at night, causing sleep disturbances. Physical exam findings include sinus drainage and runny nose. The cough may be related to chronic inflammation or an untreated sinus infection.  Treatment plan: He has been advised to use Allegra and Flonase daily for allergy control.  Augmentin will be prescribed for a potential sinus infection, to be taken twice daily for 7 days.    Orders:  1. SOB (shortness of breath)  - PULMONARY FUNCTION TESTS -Test requested: Complete Pulmonary Function Test; Future    2. Chronic cough  - PULMONARY FUNCTION TESTS -Test requested: Complete Pulmonary Function Test; Future    3. Acute non-recurrent maxillary sinusitis  - amoxicillin-clavulanate (AUGMENTIN) 875-125 MG Tab; Take 1 Tablet by mouth 2 times a day for 7 days.  Dispense: 14 Tablet; Refill: 0        Followup: No follow-ups on file.         PLEASE NOTE: This dictation was created using voice recognition and "[employer] software. I have made every reasonable attempt to correct obvious errors, but I expect that there are errors of grammar and possibly content that I did not discover before finalizing the note.       [1] No Known Allergies  [2]   Current Outpatient Medications   Medication Sig Dispense Refill    albuterol 108 (90 Base) MCG/ACT Aero Soln inhalation aerosol Inhale 2 Puffs every 6 hours as needed for Shortness of Breath. 8.5 g 0    albuterol 108 (90 Base) MCG/ACT Aero Soln inhalation aerosol Inhale 2 Puffs every 6 hours as needed for Shortness of Breath. 8.5 g 0    albuterol 108 (90 Base) MCG/ACT Aero Soln inhalation aerosol Inhale 1-2 Puffs every four hours as needed for Shortness of Breath. 1 Each 0    promethazine-dextromethorphan (PROMETHAZINE-DM) 6.25-15 MG/5ML syrup Take 5 mL by mouth every four hours as needed for Cough. 118 mL 0    rosuvastatin (CRESTOR) 20 MG Tab Take 1 Tablet by mouth every evening. 90 Tablet 3    sildenafil citrate (VIAGRA) 50 MG tablet Take 1 Tablet by mouth 1 time a day as needed for Erectile Dysfunction. 90 Tablet 3     No current facility-administered medications for this visit.

## 2025-06-05 NOTE — Clinical Note
REFERRAL APPROVAL NOTICE         Sent on June 5, 2025                   Gadiel Edgar  [address] [place] [address]                   Dear Mr. Edgar,    After a careful review of the medical information and benefit coverage, [facility] has processed your referral. See below for additional details.    If applicable, you must be actively enrolled with your insurance for coverage of the authorized service. If you have any questions regarding your coverage, please contact your insurance directly.    REFERRAL INFORMATION   Referral #:  [id number]  Referred-To Department    Referred-By Provider:  Pulmonary and Sleep Medicine    Kavon Mcfadden M.D.   Pulmonary Rehab [facility]      [address] [facility] [address]  [place] [address]  [phone] [id number]  [place] [address]  [phone]    Referral Start Date:  06/04/2025  Referral End Date:   06/04/2026             SCHEDULING  If you do not already have an appointment, please call [phone] to make an appointment.     MORE INFORMATION  If you do not already have a [employer] account, sign up at: [email or web address]  You can access your medical information, make appointments, see lab results, billing information, and more.  If you have questions regarding this referral, please contact  the [facility] Referrals department at:             [phone]. Monday - Friday 8:00AM - 5:00PM.     Sincerely,    [facility]

## 2025-06-26 ENCOUNTER — HOSPITAL ENCOUNTER (OUTPATIENT)
Dept: PULMONOLOGY | Facility: MEDICAL CENTER | Age: 45
End: 2025-06-26
Attending: FAMILY MEDICINE
Payer: COMMERCIAL

## 2025-06-26 DIAGNOSIS — R06.02 SOB (SHORTNESS OF BREATH): ICD-10-CM

## 2025-06-26 DIAGNOSIS — R05.3 CHRONIC COUGH: ICD-10-CM

## 2025-06-26 PROCEDURE — 94729 DIFFUSING CAPACITY: CPT

## 2025-06-26 PROCEDURE — 94729 DIFFUSING CAPACITY: CPT | Mod: 26 | Performed by: STUDENT IN AN ORGANIZED HEALTH CARE EDUCATION/TRAINING PROGRAM

## 2025-06-26 PROCEDURE — 94060 EVALUATION OF WHEEZING: CPT

## 2025-06-26 PROCEDURE — 94726 PLETHYSMOGRAPHY LUNG VOLUMES: CPT | Mod: 26 | Performed by: STUDENT IN AN ORGANIZED HEALTH CARE EDUCATION/TRAINING PROGRAM

## 2025-06-26 PROCEDURE — 94060 EVALUATION OF WHEEZING: CPT | Mod: 26 | Performed by: STUDENT IN AN ORGANIZED HEALTH CARE EDUCATION/TRAINING PROGRAM

## 2025-06-26 PROCEDURE — 94726 PLETHYSMOGRAPHY LUNG VOLUMES: CPT

## 2025-06-26 RX ORDER — ALBUTEROL SULFATE 5 MG/ML
2.5 SOLUTION RESPIRATORY (INHALATION)
Status: DISCONTINUED | OUTPATIENT
Start: 2025-06-26 | End: 2025-06-27 | Stop reason: HOSPADM

## 2025-06-26 RX ADMIN — ALBUTEROL SULFATE 2.5 MG: 5 SOLUTION RESPIRATORY (INHALATION) at 08:17

## 2025-06-27 NOTE — PROCEDURES
DATE OF SERVICE:  06/26/2025     PULMONARY FUNCTION TEST INTERPRETATION     REFERRING PROVIDER:  Kavon Mcfadden MD     REASON FOR REFERRAL:  Shortness of breath and chronic cough.     DATA:  1.  FVC 4.91 liters, Z-score 1.20.  2.  FEV1 3.83 liters, Z-score 1.04.  3.  FEV1/FVC 78%, Z-score -0.42.  4.  TLC 6.74 liters, Z-score 1.75.  5.  DLCO 31.32 mL/min/mmHg, Z-score 2.01.  6.  Flow volume loop:  Normal.     INTERPRETATION:  Spirometry is normal and shows no airflow obstruction.  No   bronchodilator response is observed, which does not preclude a clinical   response to bronchodilator therapy.  Lung volumes demonstrate mild   hyperinflation.  DLCO is supranormal.     CONCLUSION:  Normal spirometry, mild hyperinflation and supranormal DLCO can   be seen in the clinical setting of asthma.  Correlate clinically.        ______________________________  MD GISSELLE Cope/BYRON    DD:  06/26/2025 19:44  DT:  06/26/2025 22:19    Job#:  [id number]

## 2025-07-11 ENCOUNTER — APPOINTMENT (OUTPATIENT)
Dept: MEDICAL GROUP | Facility: LAB | Age: 45
End: 2025-07-11
Payer: COMMERCIAL

## 2025-07-11 VITALS
OXYGEN SATURATION: 96 % | SYSTOLIC BLOOD PRESSURE: 124 MMHG | RESPIRATION RATE: 14 BRPM | TEMPERATURE: 97.7 F | WEIGHT: 163 LBS | DIASTOLIC BLOOD PRESSURE: 82 MMHG | HEART RATE: 81 BPM | BODY MASS INDEX: 24.14 KG/M2 | HEIGHT: 69 IN

## 2025-07-11 DIAGNOSIS — J30.2 SEASONAL ALLERGIES: ICD-10-CM

## 2025-07-11 DIAGNOSIS — J45.41 MODERATE PERSISTENT ASTHMA WITH ACUTE EXACERBATION: Primary | ICD-10-CM

## 2025-07-11 PROCEDURE — 99214 OFFICE O/P EST MOD 30 MIN: CPT | Performed by: FAMILY MEDICINE

## 2025-07-11 PROCEDURE — 3079F DIAST BP 80-89 MM HG: CPT | Performed by: FAMILY MEDICINE

## 2025-07-11 PROCEDURE — 3074F SYST BP LT 130 MM HG: CPT | Performed by: FAMILY MEDICINE

## 2025-07-11 RX ORDER — BUDESONIDE AND FORMOTEROL FUMARATE DIHYDRATE 160; 4.5 UG/1; UG/1
2 AEROSOL RESPIRATORY (INHALATION) 2 TIMES DAILY
Qty: 10.2 G | Refills: 11 | Status: SHIPPED | OUTPATIENT
Start: 2025-07-11

## 2025-07-11 RX ORDER — ALBUTEROL SULFATE 90 UG/1
2 INHALANT RESPIRATORY (INHALATION) EVERY 4 HOURS PRN
Qty: 1 EACH | Refills: 11 | Status: SHIPPED | OUTPATIENT
Start: 2025-07-11

## 2025-07-11 RX ORDER — PREDNISONE 20 MG/1
40 TABLET ORAL DAILY
COMMUNITY
Start: 2025-06-27

## 2025-07-11 RX ORDER — ALBUTEROL SULFATE 2.5 MG/.5ML
SOLUTION RESPIRATORY (INHALATION)
COMMUNITY
Start: 2025-06-06

## 2025-07-11 RX ORDER — ALBUTEROL SULFATE 90 UG/1
2 INHALANT RESPIRATORY (INHALATION) EVERY 6 HOURS PRN
Qty: 18 G | Refills: 11 | Status: CANCELLED | OUTPATIENT
Start: 2025-07-11

## 2025-07-11 RX ORDER — DEXAMETHASONE 4 MG/1
TABLET ORAL
COMMUNITY
Start: 2025-06-06

## 2025-07-11 RX ORDER — MONTELUKAST SODIUM 10 MG/1
10 TABLET ORAL DAILY
Qty: 90 TABLET | Refills: 3 | Status: SHIPPED | OUTPATIENT
Start: 2025-07-11

## 2025-07-11 ASSESSMENT — PATIENT HEALTH QUESTIONNAIRE - PHQ9: CLINICAL INTERPRETATION OF PHQ2 SCORE: 0

## 2025-07-11 ASSESSMENT — FIBROSIS 4 INDEX: FIB4 SCORE: 0.51

## 2025-07-11 NOTE — PROGRESS NOTES
Verbal consent was acquired by the patient to use [employer] ambient listening note generation during this visit Yes    Subjective:   Gadiel Edgar is a 44 y.o. male here today for   Chief Complaint   Patient presents with    Asthma     History of Present Illness  The patient is a 44-year-old male with a history of significant seasonal allergies and asthma-like symptoms, here today to review recent pulmonary function tests (PFTs) and discuss further treatment for atopic diseases.    He reports an improvement in his condition but still experiences severe episodes. He describes an incident where he was exposed to smoke, which led to a sensation of his lungs being crushed and persistent coughing. He also mentions waking up at night gasping for air, which causes him to panic and sweat. He has been using albuterol more frequently recently. He has two  albuterol inhalers at home and is unsure if they are still effective. He received a puffer in  when he was sick and has been using it since then. He has noticed that discontinuing steroids results in coughing and shaking. He recently had a severe episode that required emergency room treatment with a nebulizer and steroids.     He feels his allergies have worsened since his last visit and is seeking a referral for allergy testing. He is currently taking Allegra-D and Flonase for his allergies. He is considering switching to Claritin or Zyrtec and is curious about their effectiveness. He sought a second opinion from another doctor who conducted blood work and found elevated allergy levels.    SOCIAL HISTORY  - Stopped smoking    FAMILY HISTORY  - Mother and brother get shots for allergies      Allergies[1]      Current medicines (including changes today)  Current Medications[2]  He  has a past medical history of Patient denies medical problems.    ROS   ROS  -See HPI     Objective:     Physical Exam:  /82 (BP Location: Left arm, Patient Position:  "Sitting, BP Cuff Size: Adult)   Pulse 81   Temp 36.5 °C (97.7 °F) (Temporal)   Resp 14   Ht 1.753 m (5' 9.02\")   Wt 73.9 kg (163 lb)   SpO2 96%  Body mass index is 24.06 kg/m².   Constitutional: Alert, no distress.  Skin: Warm, dry, good turgor, no rashes in visible areas.  Eye: Equal, round and reactive, conjunctiva clear, lids normal.  Respiratory: Inspiratory/expiratory wheezing heard in lung fields bilaterally Cardiovascular: Normal S1, S2, no murmur, no edema.  Abdomen: Soft, non-tender, no masses, no hepatosplenomegaly.  Psych: Alert and oriented x3, normal affect and mood.    Results  - Diagnostic Testing:    - Pulmonary function tests: Signs of asthma and beginning stages of COPD with hyperinflation of the lungs and supranormal DLCO    Assessment and Plan:     Assessment & Plan  Asthma  Chronic condition, uncontrolled.  Medication change needed.  Pulmonary function tests (PFTs) indicate mild hyperinflation of the lungs, suggesting early-stage COPD. However, he maintains good air movement. The PFTs also confirm the presence of asthma.  Patient continues to have significant nighttime awakenings as well as daily albuterol use indicating more persistent severe asthma.  Treatment plan: Symbicort prescribed, to be taken as 2 puffs twice daily, in the morning and at night. Albuterol will be used as needed, and he is advised to monitor its usage as an indicator of asthma control. Singulair will also be provided. He is instructed to rinse his mouth after using the steroid inhaler to prevent oral thrush.  Clinical decision making: If the controller inhaler is effective and albuterol use is limited to a few times a week, the current treatment plan will be maintained. If albuterol use remains high despite the controller inhaler, the treatment plan will be adjusted.    Seasonal allergies  He reports worsening seasonal allergies.  Treatment plan: Currently taking Allegra-D and Flonase. He is advised to continue " these medications and may switch between Allegra, Zyrtec, and Xyzal as needed.  Clinical decision making: Allergy injections will be considered if the current treatment plan proves ineffective. Discussed the possibility of referral to an allergist if symptoms persist despite treatment.    Follow-up: A follow-up appointment is scheduled for 1 month from now.    Orders:  1. Moderate persistent asthma with acute exacerbation  - budesonide-formoterol (SYMBICORT) 160-4.5 MCG/ACT Aerosol; Inhale 2 Puffs 2 times a day.  Dispense: 10.2 g; Refill: 11  - montelukast (SINGULAIR) 10 MG Tab; Take 1 Tablet by mouth every day.  Dispense: 90 Tablet; Refill: 3  - albuterol 108 (90 Base) MCG/ACT Aero Soln inhalation aerosol; Inhale 2 Puffs every four hours as needed for Shortness of Breath.  Dispense: 1 Each; Refill: 11    2. Seasonal allergies  - montelukast (SINGULAIR) 10 MG Tab; Take 1 Tablet by mouth every day.  Dispense: 90 Tablet; Refill: 3    Other orders  - ALBUTEROL SULFATE 2.5 MG/0.5ML Nebu Soln; USE 1 VIAL (0.5ML) IN NEBULIZER EVERY 6 HOURS AS NEEDED FOR WHEEZING  - predniSONE (DELTASONE) 20 MG Tab; Take 40 mg by mouth every day.  - dexamethasone (DECADRON) 4 MG Tab; TAKE 4 TABLETs BY MOUTH once. Take 24 hours after ER visit        Followup: No follow-ups on file.         PLEASE NOTE: This dictation was created using voice recognition and [employer] ambient listening software. I have made every reasonable attempt to correct obvious errors, but I expect that there are errors of grammar and possibly content that I did not discover before finalizing the note.         [1] No Known Allergies  [2]   Current Outpatient Medications   Medication Sig Dispense Refill    ALBUTEROL SULFATE 2.5 MG/0.5ML Nebu Soln USE 1 VIAL (0.5ML) IN NEBULIZER EVERY 6 HOURS AS NEEDED FOR WHEEZING      predniSONE (DELTASONE) 20 MG Tab Take 40 mg by mouth every day.      dexamethasone (DECADRON) 4 MG Tab TAKE 4 TABLETs BY MOUTH once. Take 24 hours after  ER visit      albuterol 108 (90 Base) MCG/ACT Aero Soln inhalation aerosol Inhale 2 Puffs every 6 hours as needed for Shortness of Breath. 8.5 g 0    albuterol 108 (90 Base) MCG/ACT Aero Soln inhalation aerosol Inhale 2 Puffs every 6 hours as needed for Shortness of Breath. 8.5 g 0    albuterol 108 (90 Base) MCG/ACT Aero Soln inhalation aerosol Inhale 1-2 Puffs every four hours as needed for Shortness of Breath. 1 Each 0    promethazine-dextromethorphan (PROMETHAZINE-DM) 6.25-15 MG/5ML syrup Take 5 mL by mouth every four hours as needed for Cough. 118 mL 0    rosuvastatin (CRESTOR) 20 MG Tab Take 1 Tablet by mouth every evening. 90 Tablet 3    sildenafil citrate (VIAGRA) 50 MG tablet Take 1 Tablet by mouth 1 time a day as needed for Erectile Dysfunction. 90 Tablet 3     No current facility-administered medications for this visit.

## 2025-08-14 ENCOUNTER — OFFICE VISIT (OUTPATIENT)
Dept: MEDICAL GROUP | Facility: LAB | Age: 45
End: 2025-08-14
Payer: COMMERCIAL

## 2025-08-14 VITALS
SYSTOLIC BLOOD PRESSURE: 122 MMHG | HEART RATE: 79 BPM | RESPIRATION RATE: 14 BRPM | WEIGHT: 170 LBS | HEIGHT: 69 IN | DIASTOLIC BLOOD PRESSURE: 76 MMHG | BODY MASS INDEX: 25.18 KG/M2 | TEMPERATURE: 97.6 F | OXYGEN SATURATION: 98 %

## 2025-08-14 DIAGNOSIS — J30.2 SEASONAL ALLERGIES: ICD-10-CM

## 2025-08-14 DIAGNOSIS — J45.41 MODERATE PERSISTENT ASTHMA WITH ACUTE EXACERBATION: ICD-10-CM

## 2025-08-14 DIAGNOSIS — F33.0 MILD EPISODE OF RECURRENT MAJOR DEPRESSIVE DISORDER (HCC): Primary | ICD-10-CM

## 2025-08-14 PROCEDURE — 99214 OFFICE O/P EST MOD 30 MIN: CPT | Performed by: FAMILY MEDICINE

## 2025-08-14 PROCEDURE — 3078F DIAST BP <80 MM HG: CPT | Performed by: FAMILY MEDICINE

## 2025-08-14 PROCEDURE — 3074F SYST BP LT 130 MM HG: CPT | Performed by: FAMILY MEDICINE

## 2025-08-14 ASSESSMENT — FIBROSIS 4 INDEX: FIB4 SCORE: 0.51

## 2025-08-22 ENCOUNTER — TELEPHONE (OUTPATIENT)
Dept: MEDICAL GROUP | Facility: LAB | Age: 45
End: 2025-08-22
Payer: COMMERCIAL